# Patient Record
Sex: FEMALE | Race: ASIAN | HISPANIC OR LATINO | URBAN - METROPOLITAN AREA
[De-identification: names, ages, dates, MRNs, and addresses within clinical notes are randomized per-mention and may not be internally consistent; named-entity substitution may affect disease eponyms.]

---

## 2021-06-10 ENCOUNTER — EMERGENCY (EMERGENCY)
Facility: HOSPITAL | Age: 31
LOS: 1 days | Discharge: HOME | End: 2021-06-10
Attending: STUDENT IN AN ORGANIZED HEALTH CARE EDUCATION/TRAINING PROGRAM | Admitting: STUDENT IN AN ORGANIZED HEALTH CARE EDUCATION/TRAINING PROGRAM
Payer: MEDICAID

## 2021-06-10 VITALS
RESPIRATION RATE: 18 BRPM | OXYGEN SATURATION: 100 % | HEART RATE: 82 BPM | TEMPERATURE: 98 F | DIASTOLIC BLOOD PRESSURE: 77 MMHG | SYSTOLIC BLOOD PRESSURE: 114 MMHG

## 2021-06-10 VITALS
HEART RATE: 72 BPM | RESPIRATION RATE: 17 BRPM | OXYGEN SATURATION: 100 % | SYSTOLIC BLOOD PRESSURE: 116 MMHG | DIASTOLIC BLOOD PRESSURE: 69 MMHG

## 2021-06-10 LAB
ALBUMIN SERPL ELPH-MCNC: 4.4 G/DL — SIGNIFICANT CHANGE UP (ref 3.3–5)
ALP SERPL-CCNC: 37 U/L — LOW (ref 40–120)
ALT FLD-CCNC: 14 U/L — SIGNIFICANT CHANGE UP (ref 4–33)
ANION GAP SERPL CALC-SCNC: 13 MMOL/L — SIGNIFICANT CHANGE UP (ref 7–14)
AST SERPL-CCNC: 14 U/L — SIGNIFICANT CHANGE UP (ref 4–32)
BASOPHILS # BLD AUTO: 0.04 K/UL — SIGNIFICANT CHANGE UP (ref 0–0.2)
BASOPHILS NFR BLD AUTO: 0.5 % — SIGNIFICANT CHANGE UP (ref 0–2)
BILIRUB SERPL-MCNC: 0.3 MG/DL — SIGNIFICANT CHANGE UP (ref 0.2–1.2)
BLD GP AB SCN SERPL QL: NEGATIVE — SIGNIFICANT CHANGE UP
BUN SERPL-MCNC: 8 MG/DL — SIGNIFICANT CHANGE UP (ref 7–23)
CALCIUM SERPL-MCNC: 9 MG/DL — SIGNIFICANT CHANGE UP (ref 8.4–10.5)
CHLORIDE SERPL-SCNC: 103 MMOL/L — SIGNIFICANT CHANGE UP (ref 98–107)
CO2 SERPL-SCNC: 21 MMOL/L — LOW (ref 22–31)
CREAT SERPL-MCNC: 0.51 MG/DL — SIGNIFICANT CHANGE UP (ref 0.5–1.3)
EOSINOPHIL # BLD AUTO: 0.46 K/UL — SIGNIFICANT CHANGE UP (ref 0–0.5)
EOSINOPHIL NFR BLD AUTO: 5.3 % — SIGNIFICANT CHANGE UP (ref 0–6)
GLUCOSE SERPL-MCNC: 93 MG/DL — SIGNIFICANT CHANGE UP (ref 70–99)
HCT VFR BLD CALC: 41.8 % — SIGNIFICANT CHANGE UP (ref 34.5–45)
HGB BLD-MCNC: 13.5 G/DL — SIGNIFICANT CHANGE UP (ref 11.5–15.5)
IANC: 5.8 K/UL — SIGNIFICANT CHANGE UP (ref 1.5–8.5)
IMM GRANULOCYTES NFR BLD AUTO: 0.3 % — SIGNIFICANT CHANGE UP (ref 0–1.5)
LYMPHOCYTES # BLD AUTO: 1.79 K/UL — SIGNIFICANT CHANGE UP (ref 1–3.3)
LYMPHOCYTES # BLD AUTO: 20.5 % — SIGNIFICANT CHANGE UP (ref 13–44)
MCHC RBC-ENTMCNC: 30.6 PG — SIGNIFICANT CHANGE UP (ref 27–34)
MCHC RBC-ENTMCNC: 32.3 GM/DL — SIGNIFICANT CHANGE UP (ref 32–36)
MCV RBC AUTO: 94.8 FL — SIGNIFICANT CHANGE UP (ref 80–100)
MONOCYTES # BLD AUTO: 0.61 K/UL — SIGNIFICANT CHANGE UP (ref 0–0.9)
MONOCYTES NFR BLD AUTO: 7 % — SIGNIFICANT CHANGE UP (ref 2–14)
NEUTROPHILS # BLD AUTO: 5.8 K/UL — SIGNIFICANT CHANGE UP (ref 1.8–7.4)
NEUTROPHILS NFR BLD AUTO: 66.4 % — SIGNIFICANT CHANGE UP (ref 43–77)
NRBC # BLD: 0 /100 WBCS — SIGNIFICANT CHANGE UP
NRBC # FLD: 0 K/UL — SIGNIFICANT CHANGE UP
PLATELET # BLD AUTO: 262 K/UL — SIGNIFICANT CHANGE UP (ref 150–400)
POTASSIUM SERPL-MCNC: 3.7 MMOL/L — SIGNIFICANT CHANGE UP (ref 3.5–5.3)
POTASSIUM SERPL-SCNC: 3.7 MMOL/L — SIGNIFICANT CHANGE UP (ref 3.5–5.3)
PROT SERPL-MCNC: 8.1 G/DL — SIGNIFICANT CHANGE UP (ref 6–8.3)
RBC # BLD: 4.41 M/UL — SIGNIFICANT CHANGE UP (ref 3.8–5.2)
RBC # FLD: 12.1 % — SIGNIFICANT CHANGE UP (ref 10.3–14.5)
RH IG SCN BLD-IMP: POSITIVE — SIGNIFICANT CHANGE UP
SODIUM SERPL-SCNC: 137 MMOL/L — SIGNIFICANT CHANGE UP (ref 135–145)
WBC # BLD: 8.73 K/UL — SIGNIFICANT CHANGE UP (ref 3.8–10.5)
WBC # FLD AUTO: 8.73 K/UL — SIGNIFICANT CHANGE UP (ref 3.8–10.5)

## 2021-06-10 PROCEDURE — 99284 EMERGENCY DEPT VISIT MOD MDM: CPT

## 2021-06-10 PROCEDURE — 76817 TRANSVAGINAL US OBSTETRIC: CPT | Mod: 26

## 2021-06-10 NOTE — ED PROVIDER NOTE - OBJECTIVE STATEMENT
31y Female with ?history of PCOS,  presents to the ER for vaginal bleeding. 31y Female with ?history of PCOS,  presents to the ER for vaginal bleeding. Patient states she is about 6 weeks pregnant, developed vaginal bleeding that started at 4am. Reports it is spotting, no clot passage associated with bilateral lower abdominal/pelvic cramping. Last LMP was 21 but states she does not get her period regularly. Seen by OB last week Dr. Virginia Byrd in [place] and hcg was 6,000. No bedside ultrasound performed. First ultrasound scheduled for 21. Denies fever, chills or painful urination.

## 2021-06-10 NOTE — ED PROVIDER NOTE - CLINICAL SUMMARY MEDICAL DECISION MAKING FREE TEXT BOX
31y Female with ?history of PCOS,  presents to the ER for vaginal bleeding. Closed cervix, without vaginal bleeding or pooling. Will get labs, TVUS, reassess. Dispo pending results.

## 2021-06-10 NOTE — ED PROVIDER NOTE - NSFOLLOWUPINSTRUCTIONS_ED_ALL_ED_FT
FOLLOW UP WITH YOUR OB WITHIN 48-72 HOURS - BRING A COPY OF YOUR RESULTS.     Take Tylenol 650mg (Two 325 mg pills) every 4-6 hours as needed for pain.      Vaginal Bleeding During Pregnancy, First Trimester       A small amount of bleeding (spotting) from the vagina is common during early pregnancy. Sometimes the bleeding is normal and does not cause problems. At other times, though, bleeding may be a sign of something serious. Tell your doctor about any bleeding from your vagina right away.    Follow these instructions at home:    Activity     •Follow your doctor's instructions about how active you can be.  •If needed, make plans for someone to help with your normal activities.  • Do not have sex or orgasms until your doctor says that this is safe.    General instructions     •Take over-the-counter and prescription medicines only as told by your doctor.  •Watch your condition for any changes.    •Write down:  •The number of pads you use each day.  •How often you change pads.  •How soaked (saturated) your pads are.  • Do not use tampons.  • Do not douche.    •If you pass any tissue from your vagina, save it to show to your doctor.  •Keep all follow-up visits as told by your doctor. This is important.      Contact a doctor if:    •You have vaginal bleeding at any time while you are pregnant.  •You have cramps.  •You have a fever.    Get help right away if:    •You have very bad cramps in your back or belly (abdomen).  •You pass large clots or a lot of tissue from your vagina.  •Your bleeding gets worse.  •You feel light-headed.  •You feel weak.  •You pass out (faint).  •You have chills.  •You are leaking fluid from your vagina.  •You have a gush of fluid from your vagina.    Summary    •Sometimes vaginal bleeding during pregnancy is normal and does not cause problems. At other times, bleeding may be a sign of something serious.  •Tell your doctor about any bleeding from your vagina right away.  •Follow your doctor's instructions about how active you can be. You may need someone to help you with your normal activities.    This information is not intended to replace advice given to you by your health care provider. Make sure you discuss any questions you have with your health care provider.    Advance activity as tolerated.     Continue all previously prescribed medications as directed unless otherwise instructed.     If you have issues obtaining follow up, please call: [phone] to obtain a doctor or specialist who takes your insurance in your area.  You may call [phone] to make an appointment with the internal medicine clinic.

## 2021-06-10 NOTE — ED PROVIDER NOTE - NS ED ROS FT
Constitutional: (-) Fever, (-) Chills, (-) Anorexia  Skin: (-) Color changes, (-) Rashes, (-) Wounds  Eyes: (-) Visual changes  CV: (-) Chest pain, (-) Palpitations  Resp: (-) Cough, (-) Shortness of breath, (-) Dyspnea on Exertion, (-) Wheezing  GI: (+) Abdominal pain, (-) Nausea, (-) Vomiting, (-) Diarrhea  : (+)Vaginal bleeding, (-) Dysuria, (-) Hematuria, (-) Increased frequency  MSK: (-) Weakness, (-) Myalgias, (-) Back pain, (-) Calf pain  Neuro: (-) Headache

## 2021-06-10 NOTE — ED PROVIDER NOTE - PATIENT PORTAL LINK FT
You can access the FollowMyHealth Patient Portal offered by [facility] by registering at the following website: [email or web address]. By joining [employer]’s FollowMyHealth portal, you will also be able to view your health information using other applications (apps) compatible with our system.

## 2021-06-10 NOTE — ED PROVIDER NOTE - PROGRESS NOTE DETAILS
JORDAN Valdes: TVUS performed at bedside by ED US team - intrauterine pregnancy measuring 6 weeks 1 day, HR 111bpm (within normal limit for this age), left ovarian cyst measuring 8mm. Results discussed with Dr. Choi and patient. Lab work pending. Will continuing to reassess. JORDAN Valdes: Results reviewed with patient. Pending serum hcg. Patient denies pain or any new complaints at this time. Plan to discharge home with OB follow up and strict return precautions.

## 2021-06-10 NOTE — ED PROVIDER NOTE - GENITOURINARY SPECULUM EXAM
closed cervix, no abnormal discharge, vaginal bleeding or clots. mild left adnexal pain, no cervical motion tenderness

## 2021-06-10 NOTE — ED PROVIDER NOTE - ATTENDING CONTRIBUTION TO CARE
30 yo female with PMH PCOS, , LMP 21 presents to ED for evaluation of vaginal bleeding and BL low abd cramping. Denies urinary symptoms.   Gen: no acute distress, well appearing, awake, alert and oriented x 3  Cardiac: regular rate and rhythm, +S1S2  Pulm: Clear to auscultation bilaterally  Abd: soft, nontender, nondistended, no guarding  A/P labs, imaging, reassess

## 2021-08-14 ENCOUNTER — OUTPATIENT (OUTPATIENT)
Dept: OUTPATIENT SERVICES | Facility: HOSPITAL | Age: 31
LOS: 1 days | Discharge: HOME | End: 2021-08-14
Payer: MEDICAID

## 2021-08-14 VITALS
SYSTOLIC BLOOD PRESSURE: 110 MMHG | RESPIRATION RATE: 16 BRPM | HEART RATE: 93 BPM | TEMPERATURE: 98 F | DIASTOLIC BLOOD PRESSURE: 51 MMHG

## 2021-08-14 VITALS — SYSTOLIC BLOOD PRESSURE: 110 MMHG | DIASTOLIC BLOOD PRESSURE: 51 MMHG | HEART RATE: 93 BPM

## 2021-08-14 DIAGNOSIS — Z3A.00 WEEKS OF GESTATION OF PREGNANCY NOT SPECIFIED: ICD-10-CM

## 2021-08-14 DIAGNOSIS — O26.899 OTHER SPECIFIED PREGNANCY RELATED CONDITIONS, UNSPECIFIED TRIMESTER: ICD-10-CM

## 2021-08-14 PROCEDURE — 99214 OFFICE O/P EST MOD 30 MIN: CPT

## 2021-08-14 NOTE — OB PROVIDER TRIAGE NOTE - ADDITIONAL INSTRUCTIONS
Discussed findings with Dr Vasques  Patient is cleared for discharge home.  To f/u with Dr Vasques tomorrow 8/15/21, as scheduled.

## 2021-08-14 NOTE — OB PROVIDER TRIAGE NOTE - HISTORY OF PRESENT ILLNESS
[facility] Provider:  Dr Vasques  EDC:  2022.  Patient is a 30y/o  15 3/7wks gestation who reports to triage with c/o pinkish spotting on her panty liner this morning;  0800 and intermittent lower back pain x 1 wk.  Denies dysuria, fever, chills, loss of fluids, abdominal cramping or recent coitus.    AP Course:  h/o vaginal spotting and abdominal cramping on 6/10.  Meds:  pnv  NKDA    PMHx - bilateral  breast mass  PSHx - lumpectomy of benign rt. breast mass - 10yr ago  OB - Present  Gyn - PCOS;  SH/Psych - denies

## 2021-08-14 NOTE — OB PROVIDER TRIAGE NOTE - NSHPPHYSICALEXAM_GEN_ALL_CORE
Vital Signs   T(C): 36.8 (14 Aug 2021 10:45), Max: 36.8 (14 Aug 2021 10:45)  T(F): 98.2 (14 Aug 2021 10:45), Max: 98.2 (14 Aug 2021 10:45)  HR: 93 (14 Aug 2021 10:52) (93 - 93)  BP: 110/51 (14 Aug 2021 10:52) (110/51 - 110/51)  RR: 16 (14 Aug 2021 10:45) (16 - 16)    Abdomen gravid, soft and nontender  TAS - breech/ant plac/mvp 3.24           positive FH 150bpm  SSE - No active process noted Vital Signs   T(C): 36.8 (14 Aug 2021 10:45), Max: 36.8 (14 Aug 2021 10:45)  T(F): 98.2 (14 Aug 2021 10:45), Max: 98.2 (14 Aug 2021 10:45)  HR: 93 (14 Aug 2021 10:52) (93 - 93)  BP: 110/51 (14 Aug 2021 10:52) (110/51 - 110/51)  RR: 16 (14 Aug 2021 10:45) (16 - 16)  Blood type - A +    Abdomen gravid, soft and nontender  Neg cva/suprapubic pain/tenderness  TAS - breech/ant plac/mvp 3.24           positive FH 150bpm           ant wall fibroid noted.  SSE - No active process noted.  Friable cervix.  Pinkish staining when touched.  Cervix appears closed on inspection.  SVE - deferred  TVUS - Cervical length 3.3 to 3.36 cm with no dynamic changes

## 2021-08-14 NOTE — OB PROVIDER TRIAGE NOTE - NSOBPROVIDERNOTE_OBGYN_ALL_OB_FT
No evidence of active vaginal bleeding.  Discussed findings with Dr Vasques.  Plan: patient is cleared for discharge home with bleeding precautions   Instructed to rest.  Encouraged to f/u with Dr Vasques tomorrow 8/15/21 as scheduled.

## 2021-11-11 PROBLEM — Z00.00 ENCOUNTER FOR PREVENTIVE HEALTH EXAMINATION: Status: ACTIVE | Noted: 2021-11-11

## 2021-11-19 ENCOUNTER — APPOINTMENT (OUTPATIENT)
Dept: MATERNAL FETAL MEDICINE | Facility: CLINIC | Age: 31
End: 2021-11-19

## 2021-11-19 ENCOUNTER — APPOINTMENT (OUTPATIENT)
Dept: ANTEPARTUM | Facility: CLINIC | Age: 31
End: 2021-11-19

## 2021-12-01 ENCOUNTER — OUTPATIENT (OUTPATIENT)
Dept: INPATIENT UNIT | Facility: HOSPITAL | Age: 31
LOS: 1 days | Discharge: HOME | End: 2021-12-01
Payer: MEDICAID

## 2021-12-01 VITALS
SYSTOLIC BLOOD PRESSURE: 107 MMHG | DIASTOLIC BLOOD PRESSURE: 56 MMHG | HEART RATE: 78 BPM | RESPIRATION RATE: 16 BRPM | TEMPERATURE: 98 F

## 2021-12-01 VITALS — DIASTOLIC BLOOD PRESSURE: 55 MMHG | SYSTOLIC BLOOD PRESSURE: 95 MMHG | HEART RATE: 80 BPM

## 2021-12-01 DIAGNOSIS — O26.899 OTHER SPECIFIED PREGNANCY RELATED CONDITIONS, UNSPECIFIED TRIMESTER: ICD-10-CM

## 2021-12-01 DIAGNOSIS — Z3A.00 WEEKS OF GESTATION OF PREGNANCY NOT SPECIFIED: ICD-10-CM

## 2021-12-01 PROCEDURE — 76818 FETAL BIOPHYS PROFILE W/NST: CPT | Mod: 26

## 2021-12-01 PROCEDURE — 99214 OFFICE O/P EST MOD 30 MIN: CPT | Mod: 25

## 2021-12-01 PROCEDURE — 76816 OB US FOLLOW-UP PER FETUS: CPT | Mod: 26

## 2021-12-01 NOTE — OB PROVIDER TRIAGE NOTE - NSOBPROVIDERNOTE_OBGYN_ALL_OB_FT
d/w Dr Mills d/c home resolved decreased fetal movement @ 31 wks  maternal and fetal surveillance reassuring  rest activity as tolerated  increase water intake   labor precautions instructed  keep all OB appointments   return for vaginal bleeding decreased fetal movements or any concerns  v/w discharge instructions given with patient understanding

## 2021-12-01 NOTE — OB RN TRIAGE NOTE - CHIEF COMPLAINT QUOTE
decreased fetal movement since 11/30 0100 and cramping no fetal movement since 11/30 2130 and cramping

## 2021-12-01 NOTE — OB PROVIDER TRIAGE NOTE - NS_PELVICEXAM_OBGYN_ALL_OB

## 2021-12-01 NOTE — OB PROVIDER TRIAGE NOTE - NSHPPHYSICALEXAM_GEN_ALL_CORE
abd soft gravid NT  TAS: breech  fundal placenta  +FM +FHR  EFW:1131g/2#8  WILTON: 10.76  FHT: moderate variability + accels negative decels  toco: none  Vital Signs Last 24 Hrs  T(C): 36.7 (01 Dec 2021 01:02), Max: 36.7 (01 Dec 2021 01:02)  T(F): 98.1 (01 Dec 2021 01:02), Max: 98.1 (01 Dec 2021 01:02)  HR: 78 (01 Dec 2021 03:28) (78 - 78)  BP: 87/58 (01 Dec 2021 03:28) (87/58 - 107/56)  BP(mean): --  RR: 16 (01 Dec 2021 01:02) (16 - 16)  SpO2: --

## 2021-12-01 NOTE — OB RN TRIAGE NOTE - FALL HARM RISK - UNIVERSAL INTERVENTIONS
Bed in lowest position, wheels locked, appropriate side rails in place/Call bell, personal items and telephone in reach/Instruct patient to call for assistance before getting out of bed or chair/Non-slip footwear when patient is out of bed/[place] to call system/Physically safe environment - no spills, clutter or unnecessary equipment/Purposeful Proactive Rounding/Room/bathroom lighting operational, light cord in reach

## 2021-12-01 NOTE — OB PROVIDER TRIAGE NOTE - HISTORY OF PRESENT ILLNESS
32 yo  @ 31 wks c/o no fetal movements since Monday night now with movement while in D&T. denies vb or lof, reports +GFM while in triage AP course uncomplicated thus far. denies fever chills ha n/v new swelling vision changes cp sob or cough. last saw OB .    GBS: unknown  meds: PNV Vit D  all: denies  PMH asthma(15 yrs ago) allergies  PSH: right breast biopsy   gyn hx: denies  ob hx: TOP x1 11 yrs ago

## 2022-01-04 ENCOUNTER — OUTPATIENT (OUTPATIENT)
Dept: INPATIENT UNIT | Facility: HOSPITAL | Age: 32
LOS: 1 days | Discharge: HOME | End: 2022-01-04
Payer: MEDICAID

## 2022-01-04 VITALS
RESPIRATION RATE: 15 BRPM | TEMPERATURE: 98 F | SYSTOLIC BLOOD PRESSURE: 100 MMHG | HEART RATE: 83 BPM | DIASTOLIC BLOOD PRESSURE: 57 MMHG

## 2022-01-04 VITALS — DIASTOLIC BLOOD PRESSURE: 57 MMHG | HEART RATE: 83 BPM | SYSTOLIC BLOOD PRESSURE: 100 MMHG

## 2022-01-04 DIAGNOSIS — O26.899 OTHER SPECIFIED PREGNANCY RELATED CONDITIONS, UNSPECIFIED TRIMESTER: ICD-10-CM

## 2022-01-04 DIAGNOSIS — Z3A.00 WEEKS OF GESTATION OF PREGNANCY NOT SPECIFIED: ICD-10-CM

## 2022-01-04 LAB
APPEARANCE UR: CLEAR — SIGNIFICANT CHANGE UP
BILIRUB UR-MCNC: NEGATIVE — SIGNIFICANT CHANGE UP
COLOR SPEC: SIGNIFICANT CHANGE UP
DIFF PNL FLD: NEGATIVE — SIGNIFICANT CHANGE UP
GLUCOSE UR QL: ABNORMAL
KETONES UR-MCNC: NEGATIVE — SIGNIFICANT CHANGE UP
LEUKOCYTE ESTERASE UR-ACNC: NEGATIVE — SIGNIFICANT CHANGE UP
NITRITE UR-MCNC: NEGATIVE — SIGNIFICANT CHANGE UP
PH UR: 7 — SIGNIFICANT CHANGE UP (ref 5–8)
PROT UR-MCNC: NEGATIVE — SIGNIFICANT CHANGE UP
SP GR SPEC: 1.01 — SIGNIFICANT CHANGE UP (ref 1–1.05)
UROBILINOGEN FLD QL: SIGNIFICANT CHANGE UP

## 2022-01-04 PROCEDURE — 76818 FETAL BIOPHYS PROFILE W/NST: CPT | Mod: 26

## 2022-01-04 PROCEDURE — 99213 OFFICE O/P EST LOW 20 MIN: CPT

## 2022-01-04 NOTE — OB PROVIDER TRIAGE NOTE - ADDITIONAL INSTRUCTIONS
no evidence of uti/ptl or dehydration  plan discussed with dr dobson  maternal/fetal surveillance reassuring  instructed to  medication prescribed by dr livingston for heartburn   labor instructions reviewed with patient  continue fetal kick counts, rest and activity as tolerated, increase PO fluid  follow up with dr livingston as scheduled  pt verbalized understanding of instructions given  discharged home

## 2022-01-04 NOTE — OB PROVIDER TRIAGE NOTE - NSHPLABSRESULTS_GEN_ALL_CORE
Urinalysis Basic - ( 2022 17:42 )    Color: Light Yellow / Appearance: Clear / S.010 / pH: x  Gluc: x / Ketone: Negative  / Bili: Negative / Urobili: <2 mg/dL   Blood: x / Protein: Negative / Nitrite: Negative   Leuk Esterase: Negative / RBC: x / WBC x   Sq Epi: x / Non Sq Epi: x / Bacteria: x

## 2022-01-04 NOTE — OB PROVIDER TRIAGE NOTE - NSHPPHYSICALEXAM_GEN_ALL_CORE
Vital Signs Last 24 Hrs  T(C): 36.7 (04 Jan 2022 16:44), Max: 36.7 (04 Jan 2022 16:44)  T(F): 98.1 (04 Jan 2022 16:44), Max: 98.1 (04 Jan 2022 16:44)  HR: 83 (04 Jan 2022 17:18) (83 - 83)  BP: 100/57 (04 Jan 2022 17:18) (100/57 - 100/57)  BP(mean): --  RR: 15 (04 Jan 2022 16:44) (15 - 15)  SpO2: --    A&O x3  CTAb  abdomen: gravid, soft, nontender  sve- deferred  nst in progress Vital Signs Last 24 Hrs  T(C): 36.7 (04 Jan 2022 16:44), Max: 36.7 (04 Jan 2022 16:44)  T(F): 98.1 (04 Jan 2022 16:44), Max: 98.1 (04 Jan 2022 16:44)  HR: 83 (04 Jan 2022 17:18) (83 - 83)  BP: 100/57 (04 Jan 2022 17:18) (100/57 - 100/57)  BP(mean): --  RR: 15 (04 Jan 2022 16:44) (15 - 15)  SpO2: --    A&O x3  CTAb  abdomen: gravid, soft, nontender, no CVA tenderness  sve- 0/0/-3  nst: 155 baseline, moderate variability, positive accels, no decels, no contractions  TAS: vtx, anterior placenta, bpp 8/8, mandi 9

## 2022-01-04 NOTE — OB PROVIDER TRIAGE NOTE - NS_AFI_OBGYN_ALL_OB_FT
1945 Informed by charge nurse that nephrolgist Dr. Adolph Milligan has been informed about consult; pt to be seen by Nephrology in the morning. 2051 1 unit PRBC transfusion completed. 2129 Pt alert. Asst to restroom. Pt states that she had dialysis prior to coming to hospital.     2317 Pt c/o indigestion after eating diabetic snack of crackers. Informed Dr. Agustin Mayorga, on-call hospitalist. She will place order for Tums if appropriate. 0023 Tums given. Informed pt that she is on clear liquid diet at this time, and recommended to avoid crackers. Pt agreed. SHIFT SUMMARY: Transfusion completed. Pt had BM with formed brown stool. Minimal pain except for digestion. She feels constipated in the AM, but she was informed that stool softener (colace) and miralax will be given this AM and well as bowel prep tomorrow night. Oncoming nurse was informed about held orders for bowel prep. 9

## 2022-01-04 NOTE — OB PROVIDER TRIAGE NOTE - NSOBPROVIDERNOTE_OBGYN_ALL_OB_FT
u/a sent u/a sent    This is a 31 year old  at 35.6 weeks gestational age presents to r/o ptl    no evidence of uti/ptl or dehydration  plan discussed with dr dobson  maternal/fetal surveillance reassuring  instructed to  medication prescribed by dr livingston for heartburn   labor instructions reviewed with patient  continue fetal kick counts, rest and activity as tolerated, increase PO fluid  follow up with dr livingston as scheduled  pt verbalized understanding of instructions given  discharged home

## 2022-02-07 ENCOUNTER — INPATIENT (INPATIENT)
Facility: HOSPITAL | Age: 32
LOS: 1 days | Discharge: HOME | End: 2022-02-09
Attending: OBSTETRICS & GYNECOLOGY | Admitting: OBSTETRICS & GYNECOLOGY

## 2022-02-07 ENCOUNTER — TRANSCRIPTION ENCOUNTER (OUTPATIENT)
Age: 32
End: 2022-02-07

## 2022-02-07 VITALS — TEMPERATURE: 98 F | DIASTOLIC BLOOD PRESSURE: 56 MMHG | SYSTOLIC BLOOD PRESSURE: 100 MMHG | HEART RATE: 82 BPM

## 2022-02-07 DIAGNOSIS — O26.899 OTHER SPECIFIED PREGNANCY RELATED CONDITIONS, UNSPECIFIED TRIMESTER: ICD-10-CM

## 2022-02-07 DIAGNOSIS — Z3A.00 WEEKS OF GESTATION OF PREGNANCY NOT SPECIFIED: ICD-10-CM

## 2022-02-07 LAB
BASOPHILS # BLD AUTO: 0.03 K/UL — SIGNIFICANT CHANGE UP (ref 0–0.2)
BASOPHILS NFR BLD AUTO: 0.4 % — SIGNIFICANT CHANGE UP (ref 0–2)
BLD GP AB SCN SERPL QL: NEGATIVE — SIGNIFICANT CHANGE UP
COVID-19 SPIKE DOMAIN AB INTERP: POSITIVE
COVID-19 SPIKE DOMAIN ANTIBODY RESULT: >250 U/ML — HIGH
EOSINOPHIL # BLD AUTO: 0.32 K/UL — SIGNIFICANT CHANGE UP (ref 0–0.5)
EOSINOPHIL NFR BLD AUTO: 3.9 % — SIGNIFICANT CHANGE UP (ref 0–6)
HCT VFR BLD CALC: 36.6 % — SIGNIFICANT CHANGE UP (ref 34.5–45)
HGB BLD-MCNC: 12.1 G/DL — SIGNIFICANT CHANGE UP (ref 11.5–15.5)
IANC: 5.25 K/UL — SIGNIFICANT CHANGE UP (ref 1.5–8.5)
IMM GRANULOCYTES NFR BLD AUTO: 0.4 % — SIGNIFICANT CHANGE UP (ref 0–1.5)
LYMPHOCYTES # BLD AUTO: 1.59 K/UL — SIGNIFICANT CHANGE UP (ref 1–3.3)
LYMPHOCYTES # BLD AUTO: 19.6 % — SIGNIFICANT CHANGE UP (ref 13–44)
MCHC RBC-ENTMCNC: 30.6 PG — SIGNIFICANT CHANGE UP (ref 27–34)
MCHC RBC-ENTMCNC: 33.1 GM/DL — SIGNIFICANT CHANGE UP (ref 32–36)
MCV RBC AUTO: 92.4 FL — SIGNIFICANT CHANGE UP (ref 80–100)
MONOCYTES # BLD AUTO: 0.89 K/UL — SIGNIFICANT CHANGE UP (ref 0–0.9)
MONOCYTES NFR BLD AUTO: 11 % — SIGNIFICANT CHANGE UP (ref 2–14)
NEUTROPHILS # BLD AUTO: 5.25 K/UL — SIGNIFICANT CHANGE UP (ref 1.8–7.4)
NEUTROPHILS NFR BLD AUTO: 64.7 % — SIGNIFICANT CHANGE UP (ref 43–77)
NRBC # BLD: 0 /100 WBCS — SIGNIFICANT CHANGE UP
NRBC # FLD: 0 K/UL — SIGNIFICANT CHANGE UP
PLATELET # BLD AUTO: 213 K/UL — SIGNIFICANT CHANGE UP (ref 150–400)
RBC # BLD: 3.96 M/UL — SIGNIFICANT CHANGE UP (ref 3.8–5.2)
RBC # FLD: 13.2 % — SIGNIFICANT CHANGE UP (ref 10.3–14.5)
RH IG SCN BLD-IMP: POSITIVE — SIGNIFICANT CHANGE UP
SARS-COV-2 IGG+IGM SERPL QL IA: >250 U/ML — HIGH
SARS-COV-2 IGG+IGM SERPL QL IA: POSITIVE
T PALLIDUM AB TITR SER: NEGATIVE — SIGNIFICANT CHANGE UP
WBC # BLD: 8.11 K/UL — SIGNIFICANT CHANGE UP (ref 3.8–10.5)
WBC # FLD AUTO: 8.11 K/UL — SIGNIFICANT CHANGE UP (ref 3.8–10.5)

## 2022-02-07 RX ORDER — OXYTOCIN 10 UNIT/ML
333.33 VIAL (ML) INJECTION
Qty: 20 | Refills: 0 | Status: COMPLETED | OUTPATIENT
Start: 2022-02-07 | End: 2022-02-07

## 2022-02-07 RX ORDER — OXYCODONE HYDROCHLORIDE 5 MG/1
5 TABLET ORAL ONCE
Refills: 0 | Status: DISCONTINUED | OUTPATIENT
Start: 2022-02-07 | End: 2022-02-09

## 2022-02-07 RX ORDER — TETANUS TOXOID, REDUCED DIPHTHERIA TOXOID AND ACELLULAR PERTUSSIS VACCINE, ADSORBED 5; 2.5; 8; 8; 2.5 [IU]/.5ML; [IU]/.5ML; UG/.5ML; UG/.5ML; UG/.5ML
0.5 SUSPENSION INTRAMUSCULAR ONCE
Refills: 0 | Status: COMPLETED | OUTPATIENT
Start: 2022-02-07

## 2022-02-07 RX ORDER — ONDANSETRON 8 MG/1
4 TABLET, FILM COATED ORAL ONCE
Refills: 0 | Status: COMPLETED | OUTPATIENT
Start: 2022-02-07 | End: 2022-02-07

## 2022-02-07 RX ORDER — KETOROLAC TROMETHAMINE 30 MG/ML
30 SYRINGE (ML) INJECTION ONCE
Refills: 0 | Status: DISCONTINUED | OUTPATIENT
Start: 2022-02-07 | End: 2022-02-07

## 2022-02-07 RX ORDER — OXYTOCIN 10 UNIT/ML
2 VIAL (ML) INJECTION
Qty: 30 | Refills: 0 | Status: DISCONTINUED | OUTPATIENT
Start: 2022-02-07 | End: 2022-02-07

## 2022-02-07 RX ORDER — SODIUM CHLORIDE 9 MG/ML
1000 INJECTION, SOLUTION INTRAVENOUS
Refills: 0 | Status: DISCONTINUED | OUTPATIENT
Start: 2022-02-07 | End: 2022-02-07

## 2022-02-07 RX ORDER — IBUPROFEN 200 MG
600 TABLET ORAL EVERY 6 HOURS
Refills: 0 | Status: COMPLETED | OUTPATIENT
Start: 2022-02-07 | End: 2023-01-06

## 2022-02-07 RX ORDER — OXYTOCIN 10 UNIT/ML
333.33 VIAL (ML) INJECTION
Qty: 20 | Refills: 0 | Status: DISCONTINUED | OUTPATIENT
Start: 2022-02-07 | End: 2022-02-08

## 2022-02-07 RX ORDER — MAGNESIUM HYDROXIDE 400 MG/1
30 TABLET, CHEWABLE ORAL
Refills: 0 | Status: DISCONTINUED | OUTPATIENT
Start: 2022-02-07 | End: 2022-02-09

## 2022-02-07 RX ORDER — PRAMOXINE HYDROCHLORIDE 150 MG/15G
1 AEROSOL, FOAM RECTAL EVERY 4 HOURS
Refills: 0 | Status: DISCONTINUED | OUTPATIENT
Start: 2022-02-07 | End: 2022-02-09

## 2022-02-07 RX ORDER — ACETAMINOPHEN 500 MG
975 TABLET ORAL
Refills: 0 | Status: DISCONTINUED | OUTPATIENT
Start: 2022-02-07 | End: 2022-02-08

## 2022-02-07 RX ORDER — BUTORPHANOL TARTRATE 2 MG/ML
2 INJECTION, SOLUTION INTRAMUSCULAR; INTRAVENOUS ONCE
Refills: 0 | Status: DISCONTINUED | OUTPATIENT
Start: 2022-02-07 | End: 2022-02-07

## 2022-02-07 RX ORDER — ALBUTEROL 90 UG/1
2 AEROSOL, METERED ORAL EVERY 6 HOURS
Refills: 0 | Status: DISCONTINUED | OUTPATIENT
Start: 2022-02-07 | End: 2022-02-09

## 2022-02-07 RX ORDER — LANOLIN
1 OINTMENT (GRAM) TOPICAL EVERY 6 HOURS
Refills: 0 | Status: DISCONTINUED | OUTPATIENT
Start: 2022-02-07 | End: 2022-02-09

## 2022-02-07 RX ORDER — BENZOCAINE 10 %
1 GEL (GRAM) MUCOUS MEMBRANE EVERY 6 HOURS
Refills: 0 | Status: DISCONTINUED | OUTPATIENT
Start: 2022-02-07 | End: 2022-02-09

## 2022-02-07 RX ORDER — DIBUCAINE 1 %
1 OINTMENT (GRAM) RECTAL EVERY 6 HOURS
Refills: 0 | Status: DISCONTINUED | OUTPATIENT
Start: 2022-02-07 | End: 2022-02-09

## 2022-02-07 RX ORDER — SODIUM CHLORIDE 9 MG/ML
3 INJECTION INTRAMUSCULAR; INTRAVENOUS; SUBCUTANEOUS EVERY 8 HOURS
Refills: 0 | Status: DISCONTINUED | OUTPATIENT
Start: 2022-02-07 | End: 2022-02-09

## 2022-02-07 RX ORDER — SIMETHICONE 80 MG/1
80 TABLET, CHEWABLE ORAL EVERY 4 HOURS
Refills: 0 | Status: DISCONTINUED | OUTPATIENT
Start: 2022-02-07 | End: 2022-02-09

## 2022-02-07 RX ORDER — DIPHENHYDRAMINE HCL 50 MG
25 CAPSULE ORAL EVERY 6 HOURS
Refills: 0 | Status: DISCONTINUED | OUTPATIENT
Start: 2022-02-07 | End: 2022-02-09

## 2022-02-07 RX ORDER — IBUPROFEN 200 MG
1 TABLET ORAL
Qty: 0 | Refills: 0 | DISCHARGE
Start: 2022-02-07

## 2022-02-07 RX ORDER — HYDROCORTISONE 1 %
1 OINTMENT (GRAM) TOPICAL EVERY 6 HOURS
Refills: 0 | Status: DISCONTINUED | OUTPATIENT
Start: 2022-02-07 | End: 2022-02-09

## 2022-02-07 RX ORDER — OXYCODONE HYDROCHLORIDE 5 MG/1
5 TABLET ORAL
Refills: 0 | Status: DISCONTINUED | OUTPATIENT
Start: 2022-02-07 | End: 2022-02-09

## 2022-02-07 RX ORDER — AER TRAVELER 0.5 G/1
1 SOLUTION RECTAL; TOPICAL EVERY 4 HOURS
Refills: 0 | Status: DISCONTINUED | OUTPATIENT
Start: 2022-02-07 | End: 2022-02-09

## 2022-02-07 RX ADMIN — Medication 30 MILLIGRAM(S): at 19:52

## 2022-02-07 RX ADMIN — Medication 2 MILLIUNIT(S)/MIN: at 15:19

## 2022-02-07 RX ADMIN — BUTORPHANOL TARTRATE 2 MILLIGRAM(S): 2 INJECTION, SOLUTION INTRAMUSCULAR; INTRAVENOUS at 10:20

## 2022-02-07 RX ADMIN — SODIUM CHLORIDE 3 MILLILITER(S): 9 INJECTION INTRAMUSCULAR; INTRAVENOUS; SUBCUTANEOUS at 22:45

## 2022-02-07 RX ADMIN — Medication 30 MILLIGRAM(S): at 20:48

## 2022-02-07 RX ADMIN — SODIUM CHLORIDE 125 MILLILITER(S): 9 INJECTION, SOLUTION INTRAVENOUS at 07:00

## 2022-02-07 RX ADMIN — Medication 1000 MILLIUNIT(S)/MIN: at 18:08

## 2022-02-07 RX ADMIN — ONDANSETRON 4 MILLIGRAM(S): 8 TABLET, FILM COATED ORAL at 10:27

## 2022-02-07 RX ADMIN — BUTORPHANOL TARTRATE 2 MILLIGRAM(S): 2 INJECTION, SOLUTION INTRAMUSCULAR; INTRAVENOUS at 10:00

## 2022-02-07 NOTE — CHART NOTE - NSCHARTNOTEFT_GEN_A_CORE
Ob attending    120 moderate variability accels no decels  toco q irreg  a/p cat 1 fht   patient wishes stadol   continue induction    Karen SANTAMARIA

## 2022-02-07 NOTE — OB RN PATIENT PROFILE - REASON FOR REFUSAL
Patient refused vaccine at MD office due to Tdap vaccine not coverered by insurance.  Patient willing to receive vaccination post delivery if covered by insurance.

## 2022-02-07 NOTE — OB PROVIDER H&P - ASSESSMENT
32 y/o pt 40 weeks  presents with PROM   d/w Dr Dang  Plan:  -Admit l&d. Routine labs  -IOL PO cytotec  -Fetus: cat 1 tracing, vertex presentation, continuous monitoring  -GBS negative  -Pain: patient denies pain management  -Covid 19 negative 22 for patient and support person   -Consents signed and witnessed at bedside

## 2022-02-07 NOTE — OB RN PATIENT PROFILE - FALL HARM RISK - UNIVERSAL INTERVENTIONS
Bed in lowest position, wheels locked, appropriate side rails in place/Call bell, personal items and telephone in reach/Instruct patient to call for assistance before getting out of bed or chair/Non-slip footwear when patient is out of bed/[place] to call system/Physically safe environment - no spills, clutter or unnecessary equipment/Purposeful Proactive Rounding/Room/bathroom lighting operational, light cord in reach

## 2022-02-07 NOTE — OB RN DELIVERY SUMMARY - NSSELHIDDEN_OBGYN_ALL_OB_FT
[NS_DeliveryAttending1_OBGYN_ALL_OB_FT:[id number]] [NS_DeliveryAttending1_OBGYN_ALL_OB_FT:[id number]],[NS_DeliveryRN_OBGYN_ALL_OB_FT:[id number]]

## 2022-02-07 NOTE — DISCHARGE NOTE OB - NS MD DC FALL RISK RISK
For information on Fall & Injury Prevention, visit: [email or web address] OR  [email or web address] OR  https://www.cdc.gov/steadi/patient.html

## 2022-02-07 NOTE — OB PROVIDER H&P - HISTORY OF PRESENT ILLNESS
30 y/o pt 40 weeks  presents to triage with c/o ?LOF, blood-tinged @ 0300 and irregular contractions. pt denies any n/v/d, fever or chills. pt endorses +Fetal movement  IOL on 22 post dates  AP uncomplicated thus far    Allergy: shellfish- anaphylaxis  PMH:  Asthma- last attack >15 years ago  PSH:   Right lumpectomy 10' -benign  OB:  TOP x1   GYN:  denies fibroids/cysts/stds  Social hx:  denies etoh/smoking/illicit drugs   Medications:  PNV

## 2022-02-07 NOTE — OB PROVIDER DELIVERY SUMMARY - NSLACERATRAPAIRDETAILS_OBGYN_ALL_OB_FT
first degree sutured with 3 2-0 chromic interrupted suturespreiurethral sutured with 3-0 chromic continuous lock suture

## 2022-02-07 NOTE — OB RN DELIVERY SUMMARY - NS_SEPSISRSKCALC_OBGYN_ALL_OB_FT
EOS calculated successfully. EOS Risk Factor: 0.5/1000 live births ([facility] national incidence); GA=40w5d; Temp=98.6; ROM=14.733; GBS='Negative'; Antibiotics='No antibiotics or any antibiotics < 2 hrs prior to birth'

## 2022-02-07 NOTE — DISCHARGE NOTE OB - CARE PROVIDER_API CALL
Virginia Arcos (DO)  Obstetrics and Gynecology  [address] [place] [address]  Phone: [phone]  Fax: [phone]  Follow Up Time:

## 2022-02-07 NOTE — OB RN TRIAGE NOTE - FALL HARM RISK - UNIVERSAL INTERVENTIONS
Bed in lowest position, wheels locked, appropriate side rails in place/Call bell, personal items and telephone in reach/Instruct patient to call for assistance before getting out of bed or chair/Non-slip footwear when patient is out of bed/[place] to call system/Physically safe environment - no spills, clutter or unnecessary equipment/Purposeful Proactive Rounding/Room/bathroom lighting operational, light cord in reach

## 2022-02-07 NOTE — OB PROVIDER LABOR PROGRESS NOTE - ASSESSMENT
32 y/o  at 40/5 weeks admitted for PROM @3am  - bedside placement of cervical balloon   - 60cc/60cc instilled  - pt tolerated well  - d/w Dr Mills 
Will call for epidural and then start pitocin.    D/w Dr. Lina Vidal ,PGY-1

## 2022-02-07 NOTE — DISCHARGE NOTE OB - MEDICATION SUMMARY - MEDICATIONS TO TAKE
I will START or STAY ON the medications listed below when I get home from the hospital:    ibuprofen 600 mg oral tablet  -- 1 tab(s) by mouth every 6 hours  -- Indication: For pain    Prenatal Multivitamins with Folic Acid 1 mg oral tablet  -- 1 tab(s) by mouth once a day  -- Indication: For vitamins

## 2022-02-07 NOTE — OB PROVIDER DELIVERY SUMMARY - NSPROVIDERDELIVERYNOTE_OBGYN_ALL_OB_FT
nsd girl  pudendal block  repair of firat degree and periurethral lac  spont delivery of placenta  Immediate pp condition was stable

## 2022-02-07 NOTE — OB RN PATIENT PROFILE - NSICDXPASTMEDICALHX_GEN_ALL_CORE_FT
PAST MEDICAL HISTORY:  Asthma last attack 15 years ago, has not used inhaler in years, hospitalized and intubated as a child.    Benign breast lumps     S/P lumpectomy, right breast 2011

## 2022-02-07 NOTE — OB PROVIDER DELIVERY SUMMARY - DELIVERY COMPLICATIONS; ABNORMAL FETAL HEART RATE
variable decels with pushing but moderate variability and accels excluded ongoing fetal hypoxia, peds at delivery

## 2022-02-07 NOTE — OB PROVIDER H&P - PROBLEM SELECTOR PLAN 1
-Admit l&d. Routine labs  -IOL PO cytotec  -Fetus: cat 1 tracing, vertex presentation, continuous monitoring  -GBS negative  -Pain: patient denies pain management  -Covid 19 negative 02/05/22 for patient and support person   -Consents signed and witnessed at bedside

## 2022-02-07 NOTE — OB NEONATOLOGY/PEDIATRICIAN DELIVERY SUMMARY - NSPEDSNEONOTESA_OBGYN_ALL_OB_FT
Called to Delivery by OB for Category II FHR Tracing. This is a 40 5/7 week female born to a 29 y/o , A+, prenatal labs neg, non-reactive, and immune, GBS neg (1/10), and covid neg (/) via . Maternal history of Asthma, B/l Breast lumps, with lumpectomy of benign Rt breast mass , and TOP x1 with D+C. SROM  at 03:20 am with clear fluids. Infant emerged with good cry and color. W,D,S,S. Apgars 9,9. EOS 0.21. Mother desires breast and bottle feeding and HEP B. Raquel Cardona.

## 2022-02-07 NOTE — OB RN DELIVERY SUMMARY - NS_ADMITROM_OBGYN_ALL_OB
vss. Pain management effective. Discharge instructions given. supplies given. HOA teaching reinforced. escorted home with friend and .
Yes

## 2022-02-07 NOTE — DISCHARGE NOTE OB - PATIENT PORTAL LINK FT
You can access the FollowMyHealth Patient Portal offered by [facility] by registering at the following website: [email or web address]. By joining [employer]’s FollowMyHealth portal, you will also be able to view your health information using other applications (apps) compatible with our system.

## 2022-02-07 NOTE — OB PROVIDER H&P - NSHPPHYSICALEXAM_GEN_ALL_CORE
Vital Signs Last 24 Hrs  T(C): 36.8 (07 Feb 2022 04:48), Max: 36.8 (07 Feb 2022 04:47)  T(F): 98.2 (07 Feb 2022 04:48), Max: 98.24 (07 Feb 2022 04:47)  HR: 82 (07 Feb 2022 04:48) (82 - 82)  BP: 100/56 (07 Feb 2022 04:48) (100/56 - 100/56)  BP(mean): --  RR: 16 (07 Feb 2022 04:48) (16 - 16)  SpO2: --    Abdomen: soft, non tender. no guarding or rebound tenderness  SSE:  +pooling, clear odorless   +nitrazine   +ferning  SVE: 1/50/-3  TAS: vertex presentation  EFW 6015gm by Leopold[employer]    NST in progress

## 2022-02-08 LAB
HBV SURFACE AG SERPL QL IA: SIGNIFICANT CHANGE UP
HIV 1+2 AB+HIV1 P24 AG SERPL QL IA: SIGNIFICANT CHANGE UP
RUBV IGG SER-ACNC: 1.7 INDEX — SIGNIFICANT CHANGE UP
RUBV IGG SER-IMP: POSITIVE — SIGNIFICANT CHANGE UP
T PALLIDUM AB TITR SER: NEGATIVE — SIGNIFICANT CHANGE UP

## 2022-02-08 RX ORDER — IBUPROFEN 200 MG
600 TABLET ORAL EVERY 6 HOURS
Refills: 0 | Status: DISCONTINUED | OUTPATIENT
Start: 2022-02-08 | End: 2022-02-09

## 2022-02-08 RX ORDER — ACETAMINOPHEN 500 MG
650 TABLET ORAL EVERY 6 HOURS
Refills: 0 | Status: DISCONTINUED | OUTPATIENT
Start: 2022-02-08 | End: 2022-02-09

## 2022-02-08 RX ADMIN — Medication 1 TABLET(S): at 12:12

## 2022-02-08 RX ADMIN — Medication 600 MILLIGRAM(S): at 12:13

## 2022-02-08 RX ADMIN — AER TRAVELER 1 APPLICATION(S): 0.5 SOLUTION RECTAL; TOPICAL at 05:39

## 2022-02-08 RX ADMIN — Medication 600 MILLIGRAM(S): at 19:29

## 2022-02-08 RX ADMIN — Medication 975 MILLIGRAM(S): at 10:45

## 2022-02-08 RX ADMIN — Medication 1 APPLICATION(S): at 05:41

## 2022-02-08 RX ADMIN — MAGNESIUM HYDROXIDE 30 MILLILITER(S): 400 TABLET, CHEWABLE ORAL at 14:17

## 2022-02-08 RX ADMIN — Medication 600 MILLIGRAM(S): at 05:43

## 2022-02-08 RX ADMIN — SODIUM CHLORIDE 3 MILLILITER(S): 9 INJECTION INTRAMUSCULAR; INTRAVENOUS; SUBCUTANEOUS at 22:15

## 2022-02-08 RX ADMIN — Medication 1 APPLICATION(S): at 05:40

## 2022-02-08 RX ADMIN — Medication 600 MILLIGRAM(S): at 13:00

## 2022-02-08 RX ADMIN — Medication 600 MILLIGRAM(S): at 18:34

## 2022-02-08 RX ADMIN — SODIUM CHLORIDE 3 MILLILITER(S): 9 INJECTION INTRAMUSCULAR; INTRAVENOUS; SUBCUTANEOUS at 06:12

## 2022-02-08 RX ADMIN — SODIUM CHLORIDE 3 MILLILITER(S): 9 INJECTION INTRAMUSCULAR; INTRAVENOUS; SUBCUTANEOUS at 13:59

## 2022-02-08 RX ADMIN — Medication 975 MILLIGRAM(S): at 11:30

## 2022-02-08 RX ADMIN — Medication 600 MILLIGRAM(S): at 06:10

## 2022-02-08 NOTE — LACTATION INITIAL EVALUATION - LACTATION INTERVENTIONS
initiate/review safe skin-to-skin/initiate/review hand expression/initiate/review techniques for position and latch/post discharge community resources provided/initiate/review breast massage/compression/reviewed components of an effective feeding and at least 8 effective feedings per day required/reviewed importance of monitoring infant diapers, the breastfeeding log, and minimum output each day/reviewed risks of unnecessary formula supplementation/reviewed benefits and recommendations for rooming in/reviewed feeding on demand/by cue at least 8 times a day/reviewed indications of inadequate milk transfer that would require supplementation

## 2022-02-08 NOTE — PROGRESS NOTE ADULT - SUBJECTIVE AND OBJECTIVE BOX
Post partum Day 1      Pt without complaints    Vital Signs Last 24 Hrs  T(C): 36.8 (08 Feb 2022 05:47), Max: 37.0 (07 Feb 2022 06:27)  T(F): 98.3 (08 Feb 2022 05:47), Max: 98.6 (07 Feb 2022 06:27)  HR: 77 (08 Feb 2022 05:47) (57 - 105)  BP: 104/69 (08 Feb 2022 05:47) (4/- - 139/82)  BP(mean): --  RR: 18 (08 Feb 2022 05:47) (16 - 18)  SpO2: 100% (08 Feb 2022 05:47) (90% - 100%)                        12.1   8.11  )-----------( 213      ( 07 Feb 2022 05:48 )             36.6     MEDICATIONS  (STANDING):  acetaminophen     Tablet .. 975 milliGRAM(s) Oral <User Schedule>  diphtheria/tetanus/pertussis (acellular) Vaccine (ADAcel) 0.5 milliLiter(s) IntraMuscular once  ibuprofen  Tablet. 600 milliGRAM(s) Oral every 6 hours  oxytocin Infusion 333.333 milliUNIT(s)/Min (1000 mL/Hr) IV Continuous <Continuous>  prenatal multivitamin 1 Tablet(s) Oral daily  sodium chloride 0.9% lock flush 3 milliLiter(s) IV Push every 8 hours    MEDICATIONS  (PRN):  ALBUTerol    90 MICROgram(s) HFA Inhaler 2 Puff(s) Inhalation every 6 hours PRN Wheezing  benzocaine 20%/menthol 0.5% Spray 1 Spray(s) Topical every 6 hours PRN for Perineal discomfort  dibucaine 1% Ointment 1 Application(s) Topical every 6 hours PRN Perineal discomfort  diphenhydrAMINE 25 milliGRAM(s) Oral every 6 hours PRN Pruritus  hydrocortisone 1% Cream 1 Application(s) Topical every 6 hours PRN Moderate Pain (4-6)  lanolin Ointment 1 Application(s) Topical every 6 hours PRN nipple soreness  magnesium hydroxide Suspension 30 milliLiter(s) Oral two times a day PRN Constipation  oxyCODONE    IR 5 milliGRAM(s) Oral every 3 hours PRN Moderate to Severe Pain (4-10)  oxyCODONE    IR 5 milliGRAM(s) Oral once PRN Moderate to Severe Pain (4-10)  pramoxine 1%/zinc 5% Cream 1 Application(s) Topical every 4 hours PRN Moderate Pain (4-6)  simethicone 80 milliGRAM(s) Chew every 4 hours PRN Gas  witch hazel Pads 1 Application(s) Topical every 4 hours PRN Perineal discomfort      Abdomen soft  fundus firm, non tender  extremities non tender    lochia wnl      Patient doing well  Routine post partum care

## 2022-02-09 VITALS
DIASTOLIC BLOOD PRESSURE: 60 MMHG | HEART RATE: 74 BPM | RESPIRATION RATE: 18 BRPM | SYSTOLIC BLOOD PRESSURE: 113 MMHG | OXYGEN SATURATION: 100 % | TEMPERATURE: 98 F

## 2022-02-09 RX ORDER — TETANUS TOXOID, REDUCED DIPHTHERIA TOXOID AND ACELLULAR PERTUSSIS VACCINE, ADSORBED 5; 2.5; 8; 8; 2.5 [IU]/.5ML; [IU]/.5ML; UG/.5ML; UG/.5ML; UG/.5ML
0.5 SUSPENSION INTRAMUSCULAR ONCE
Refills: 0 | Status: COMPLETED | OUTPATIENT
Start: 2022-02-09 | End: 2022-02-09

## 2022-02-09 RX ADMIN — Medication 600 MILLIGRAM(S): at 07:13

## 2022-02-09 RX ADMIN — Medication 600 MILLIGRAM(S): at 12:30

## 2022-02-09 RX ADMIN — Medication 600 MILLIGRAM(S): at 01:45

## 2022-02-09 RX ADMIN — Medication 1 APPLICATION(S): at 06:36

## 2022-02-09 RX ADMIN — Medication 600 MILLIGRAM(S): at 11:58

## 2022-02-09 RX ADMIN — AER TRAVELER 1 APPLICATION(S): 0.5 SOLUTION RECTAL; TOPICAL at 06:36

## 2022-02-09 RX ADMIN — TETANUS TOXOID, REDUCED DIPHTHERIA TOXOID AND ACELLULAR PERTUSSIS VACCINE, ADSORBED 0.5 MILLILITER(S): 5; 2.5; 8; 8; 2.5 SUSPENSION INTRAMUSCULAR at 11:58

## 2022-02-09 RX ADMIN — Medication 600 MILLIGRAM(S): at 01:14

## 2022-02-09 RX ADMIN — Medication 600 MILLIGRAM(S): at 06:36

## 2022-02-09 RX ADMIN — Medication 1 TABLET(S): at 11:58

## 2022-02-09 NOTE — PROGRESS NOTE ADULT - SUBJECTIVE AND OBJECTIVE BOX
Assessment and Plan    Day  2 Vaginal Delivery  She feels well  Continue the current pain medication  Encourage  Ambulation  Encourage regular diet   DVT ppx: SCDs only when not ambulating  She is stable, tolerates a diet and has normal flatus and bowel movements  She will be discharged on Day 2 according to the normal criteria.

## 2023-01-10 ENCOUNTER — EMERGENCY (EMERGENCY)
Facility: HOSPITAL | Age: 33
LOS: 1 days | Discharge: HOME | End: 2023-01-10
Attending: STUDENT IN AN ORGANIZED HEALTH CARE EDUCATION/TRAINING PROGRAM | Admitting: STUDENT IN AN ORGANIZED HEALTH CARE EDUCATION/TRAINING PROGRAM
Payer: MEDICAID

## 2023-01-10 VITALS
TEMPERATURE: 98 F | DIASTOLIC BLOOD PRESSURE: 62 MMHG | SYSTOLIC BLOOD PRESSURE: 107 MMHG | RESPIRATION RATE: 16 BRPM | OXYGEN SATURATION: 99 % | HEART RATE: 89 BPM

## 2023-01-10 VITALS
SYSTOLIC BLOOD PRESSURE: 113 MMHG | RESPIRATION RATE: 20 BRPM | TEMPERATURE: 98 F | DIASTOLIC BLOOD PRESSURE: 73 MMHG | OXYGEN SATURATION: 99 % | HEART RATE: 82 BPM

## 2023-01-10 PROBLEM — J45.909 UNSPECIFIED ASTHMA, UNCOMPLICATED: Chronic | Status: ACTIVE | Noted: 2021-08-14

## 2023-01-10 PROBLEM — N63.0 UNSPECIFIED LUMP IN UNSPECIFIED BREAST: Chronic | Status: ACTIVE | Noted: 2022-02-07

## 2023-01-10 PROBLEM — Z98.890 OTHER SPECIFIED POSTPROCEDURAL STATES: Chronic | Status: ACTIVE | Noted: 2022-02-07

## 2023-01-10 PROCEDURE — 71045 X-RAY EXAM CHEST 1 VIEW: CPT | Mod: 26

## 2023-01-10 PROCEDURE — 99284 EMERGENCY DEPT VISIT MOD MDM: CPT

## 2023-01-10 RX ORDER — ALBUTEROL 90 UG/1
1 AEROSOL, METERED ORAL ONCE
Refills: 0 | Status: COMPLETED | OUTPATIENT
Start: 2023-01-10 | End: 2023-01-10

## 2023-01-10 RX ADMIN — ALBUTEROL 1 PUFF(S): 90 AEROSOL, METERED ORAL at 14:06

## 2023-01-10 NOTE — ED PROVIDER NOTE - CLINICAL SUMMARY MEDICAL DECISION MAKING FREE TEXT BOX
32 F with cough x 1 week and daughter with recent adenovirus diagnosis presents after episode of post tussive emesis x 1 with blood tinged sputum. Not currently producing any blood tinged sputum. States this has happened to her in the past whenever she has a bad cough. No TB risk factors. Has left sided neck mass currently being worked up by PCP, getting biopsy end of january. Vitals stable, afebrile. exam unremarkable. Likely bronchitis from viral illness with forceful mechanism of coughing producing blood tinged sputum. Plan to get CXR and give albuterol. Will reassess.

## 2023-01-10 NOTE — ED PROVIDER NOTE - OBJECTIVE STATEMENT
32 F no pmh presenting to ED with cough x 1 week and hemoptysis x 1 day. Daughter recent diagnosis with adenovirus. Pts sx began with fever, sore throat, cough, but are now just dry cough. Was coughing spell last night which resulted in post tussive emesis with blood tinged sputum. Has had a few more episodes of blood tinged sputum when coughing since episode, but most recently did not notice any blood. States this has happened to her in the past whenever she has coughing spells. No TB risk factors. Currently feeling well other than dry cough. No fever/chills. Also noted that patient has Left sided neck mass x 4 months that is being worked up by her PCP.

## 2023-01-10 NOTE — ED PROVIDER NOTE - NSFOLLOWUPINSTRUCTIONS_ED_ALL_ED_FT
No signs of emergency medical condition on today's workup.  Presumptive diagnosis made, but further evaluation may be required by your primary care doctor or specialist for a definitive diagnosis.  Therefore, follow up as directed and if symptoms change/worsen or any emergency conditions, please return to the ER.    Cough    Coughing is a reflex that clears your throat and your airways. Coughing helps to heal and protect your lungs. It is normal to cough occasionally, but a cough that happens with other symptoms or lasts a long time may be a sign of a condition that needs treatment. Coughing may be caused by infections, asthma or COPD, smoking, postnasal drip, gastroesophageal reflux, as well as other medical conditions. Take medicines only as instructed by your health care provider. Avoid environments or triggers that causes you to cough at work or at home.    SEEK IMMEDIATE MEDICAL CARE IF YOU HAVE ANY OF THE FOLLOWING SYMPTOMS: coughing up blood, shortness of breath, rapid heart rate, chest pain, unexplained weight loss or night sweats. Use Albuterol Inhaler 2 puffs every 4 to 6 hours as needed for shortness of breath and/or wheezing.     No signs of emergency medical condition on today's workup.  Presumptive diagnosis made, but further evaluation may be required by your primary care doctor or specialist for a definitive diagnosis.  Therefore, follow up as directed and if symptoms change/worsen or any emergency conditions, please return to the ER.    Cough    Coughing is a reflex that clears your throat and your airways. Coughing helps to heal and protect your lungs. It is normal to cough occasionally, but a cough that happens with other symptoms or lasts a long time may be a sign of a condition that needs treatment. Coughing may be caused by infections, asthma or COPD, smoking, postnasal drip, gastroesophageal reflux, as well as other medical conditions. Take medicines only as instructed by your health care provider. Avoid environments or triggers that causes you to cough at work or at home.    SEEK IMMEDIATE MEDICAL CARE IF YOU HAVE ANY OF THE FOLLOWING SYMPTOMS: coughing up blood, shortness of breath, rapid heart rate, chest pain, unexplained weight loss or night sweats.

## 2023-01-10 NOTE — ED PROVIDER NOTE - NS ED ROS FT
Gen: Denies fevers  CV: Denies chest pain  Skin: denies color changes  Resp: +cough   Endo: Denies increased urination  GI: Denies nausea, vomiting  Msk: Denies extremity pain  : Denies dysuria  Neuro: Denies LOC  Psych: Denies mood changes  all other ROS negative unless indicated in HPI

## 2023-01-10 NOTE — ED ADULT TRIAGE NOTE - CHIEF COMPLAINT QUOTE
Pt c/o 1 week of cough and fever. Pt's daughter was diagnosed with adenovirus. Pt states she's been coughing up blood-tinged mucus since yesterday, along with SOB at night.

## 2023-01-10 NOTE — ED PROVIDER NOTE - PATIENT PORTAL LINK FT
You can access the FollowMyHealth Patient Portal offered by [facility] by registering at the following website: [email or web address]. By joining [facility]’s FollowMyHealth portal, you will also be able to view your health information using other applications (apps) compatible with our system.

## 2023-01-10 NOTE — ED PROVIDER NOTE - ATTENDING CONTRIBUTION TO CARE
31 yo F no pmhx presenting with c/o cough in setting of known viral URI one week ago. Pt states she is no longer having fevers and feels overall better but has a lingering dry cough. Reports episode of post tussive emesis and a few episodes of blood tinged sputum. Denies TB rf. Denies cp, no leg pain/leg swelling. Endorses sob when coughing fits occur. On exam, well appearing, NAD, speaking in full sentences, lungs ctab, heart rrr. Plan for xr, albuterol, reassess, likely dc

## 2023-01-10 NOTE — ED PROVIDER NOTE - PHYSICAL EXAMINATION
Gen: WDWN, NAD  HEENT: +Tonsillar erythema. Uvula midline. No tonsillar exudate. No peritonsillar abscess. No blood in oropharynx. No nasal bleeding. EOMI, no nasal discharge, mucous membranes moist  CV: RRR, +S1/S2, no M/R/G, 2+ radial pulses b/l  Resp: CTAB, no W/R/R, no accessory muscle use, no increased work of breathing  GI: Abdomen soft non-distended, NTTP  MSK: No open wounds, no bruising, no LE edema  Neuro: A&Ox4, following commands, moving all four extremities spontaneously  Psych: appropriate mood

## 2023-01-26 NOTE — ED ADULT NURSE NOTE - NS ED NURSE RECORD ANOTHER HT AND WT
3 day Sleep therapy management telephone visit    Diagnostic AHI: 8.8  PSG    Confirmed with patient at time of call- N/A Patient is still interested in STM service       Message left for patient to return call    Order settings:  CPAP MIN CPAP MAX   5 cm H2O 15 cm H2O       Device settings:  CPAP MIN CPAP MAX EPR RESMED SOFT RESPONSE SETTING   5.0 cm  H20 15.0 cm  H20 TWO OFF       Compliance 100 %    Assessment: Nightly usage over four hours      Action plan: Patient to have 14 day STM visit. Patient has a follow up visit scheduled:   no and not required by insurance    Replacement device: No  STM ordered by provider: Yes     Total time spent on accessing and  interpreting remote patient PAP therapy data  10 minutes    Total time spent counseling, coaching  and reviewing PAP therapy data with patient  1 minutes    [id number] no                  Yes

## 2023-04-20 ENCOUNTER — EMERGENCY (EMERGENCY)
Facility: HOSPITAL | Age: 33
LOS: 1 days | Discharge: HOME | End: 2023-04-20
Attending: EMERGENCY MEDICINE | Admitting: EMERGENCY MEDICINE
Payer: MEDICAID

## 2023-04-20 VITALS
HEART RATE: 73 BPM | DIASTOLIC BLOOD PRESSURE: 70 MMHG | TEMPERATURE: 99 F | RESPIRATION RATE: 16 BRPM | OXYGEN SATURATION: 100 % | SYSTOLIC BLOOD PRESSURE: 106 MMHG

## 2023-04-20 VITALS
DIASTOLIC BLOOD PRESSURE: 70 MMHG | HEART RATE: 88 BPM | TEMPERATURE: 98 F | SYSTOLIC BLOOD PRESSURE: 128 MMHG | RESPIRATION RATE: 16 BRPM | OXYGEN SATURATION: 99 %

## 2023-04-20 LAB
24R-OH-CALCIDIOL SERPL-MCNC: 14.5 NG/ML — LOW (ref 30–80)
ALBUMIN SERPL ELPH-MCNC: 4 G/DL — SIGNIFICANT CHANGE UP (ref 3.3–5)
ALP SERPL-CCNC: 35 U/L — LOW (ref 40–120)
ALT FLD-CCNC: 7 U/L — SIGNIFICANT CHANGE UP (ref 4–33)
ANION GAP SERPL CALC-SCNC: 12 MMOL/L — SIGNIFICANT CHANGE UP (ref 7–14)
AST SERPL-CCNC: 17 U/L — SIGNIFICANT CHANGE UP (ref 4–32)
BASOPHILS # BLD AUTO: 0.03 K/UL — SIGNIFICANT CHANGE UP (ref 0–0.2)
BASOPHILS NFR BLD AUTO: 0.4 % — SIGNIFICANT CHANGE UP (ref 0–2)
BILIRUB SERPL-MCNC: 0.3 MG/DL — SIGNIFICANT CHANGE UP (ref 0.2–1.2)
BUN SERPL-MCNC: 7 MG/DL — SIGNIFICANT CHANGE UP (ref 7–23)
CA-I BLD-SCNC: 0.97 MMOL/L — LOW (ref 1.15–1.29)
CALCIUM SERPL-MCNC: 7.8 MG/DL — LOW (ref 8.4–10.5)
CHLORIDE SERPL-SCNC: 104 MMOL/L — SIGNIFICANT CHANGE UP (ref 98–107)
CO2 SERPL-SCNC: 27 MMOL/L — SIGNIFICANT CHANGE UP (ref 22–31)
CREAT SERPL-MCNC: 0.5 MG/DL — SIGNIFICANT CHANGE UP (ref 0.5–1.3)
EGFR: 129 ML/MIN/1.73M2 — SIGNIFICANT CHANGE UP
EOSINOPHIL # BLD AUTO: 0.16 K/UL — SIGNIFICANT CHANGE UP (ref 0–0.5)
EOSINOPHIL NFR BLD AUTO: 2.1 % — SIGNIFICANT CHANGE UP (ref 0–6)
GLUCOSE SERPL-MCNC: 97 MG/DL — SIGNIFICANT CHANGE UP (ref 70–99)
HCT VFR BLD CALC: 37.1 % — SIGNIFICANT CHANGE UP (ref 34.5–45)
HGB BLD-MCNC: 12.1 G/DL — SIGNIFICANT CHANGE UP (ref 11.5–15.5)
IANC: 5.22 K/UL — SIGNIFICANT CHANGE UP (ref 1.8–7.4)
IMM GRANULOCYTES NFR BLD AUTO: 0.1 % — SIGNIFICANT CHANGE UP (ref 0–0.9)
LYMPHOCYTES # BLD AUTO: 1.45 K/UL — SIGNIFICANT CHANGE UP (ref 1–3.3)
LYMPHOCYTES # BLD AUTO: 19.4 % — SIGNIFICANT CHANGE UP (ref 13–44)
MAGNESIUM SERPL-MCNC: 2 MG/DL — SIGNIFICANT CHANGE UP (ref 1.6–2.6)
MCHC RBC-ENTMCNC: 30 PG — SIGNIFICANT CHANGE UP (ref 27–34)
MCHC RBC-ENTMCNC: 32.6 GM/DL — SIGNIFICANT CHANGE UP (ref 32–36)
MCV RBC AUTO: 92.1 FL — SIGNIFICANT CHANGE UP (ref 80–100)
MONOCYTES # BLD AUTO: 0.59 K/UL — SIGNIFICANT CHANGE UP (ref 0–0.9)
MONOCYTES NFR BLD AUTO: 7.9 % — SIGNIFICANT CHANGE UP (ref 2–14)
NEUTROPHILS # BLD AUTO: 5.22 K/UL — SIGNIFICANT CHANGE UP (ref 1.8–7.4)
NEUTROPHILS NFR BLD AUTO: 70.1 % — SIGNIFICANT CHANGE UP (ref 43–77)
NRBC # BLD: 0 /100 WBCS — SIGNIFICANT CHANGE UP (ref 0–0)
NRBC # FLD: 0 K/UL — SIGNIFICANT CHANGE UP (ref 0–0)
PHOSPHATE SERPL-MCNC: 2.2 MG/DL — LOW (ref 2.5–4.5)
PLATELET # BLD AUTO: 261 K/UL — SIGNIFICANT CHANGE UP (ref 150–400)
POTASSIUM SERPL-MCNC: 2.9 MMOL/L — CRITICAL LOW (ref 3.5–5.3)
POTASSIUM SERPL-SCNC: 2.9 MMOL/L — CRITICAL LOW (ref 3.5–5.3)
PROT SERPL-MCNC: 7.4 G/DL — SIGNIFICANT CHANGE UP (ref 6–8.3)
PTH-INTACT FLD-MCNC: 13 PG/ML — LOW (ref 15–65)
RBC # BLD: 4.03 M/UL — SIGNIFICANT CHANGE UP (ref 3.8–5.2)
RBC # FLD: 11.6 % — SIGNIFICANT CHANGE UP (ref 10.3–14.5)
SODIUM SERPL-SCNC: 143 MMOL/L — SIGNIFICANT CHANGE UP (ref 135–145)
VIT D25+D1,25 OH+D1,25 PNL SERPL-MCNC: 20.2 PG/ML — SIGNIFICANT CHANGE UP (ref 19.9–79.3)
WBC # BLD: 7.46 K/UL — SIGNIFICANT CHANGE UP (ref 3.8–10.5)
WBC # FLD AUTO: 7.46 K/UL — SIGNIFICANT CHANGE UP (ref 3.8–10.5)

## 2023-04-20 PROCEDURE — 99284 EMERGENCY DEPT VISIT MOD MDM: CPT

## 2023-04-20 PROCEDURE — 93010 ELECTROCARDIOGRAM REPORT: CPT

## 2023-04-20 RX ORDER — SODIUM,POTASSIUM PHOSPHATES 278-250MG
1 POWDER IN PACKET (EA) ORAL ONCE
Refills: 0 | Status: COMPLETED | OUTPATIENT
Start: 2023-04-20 | End: 2023-04-20

## 2023-04-20 RX ORDER — CALCIUM GLUCONATE 100 MG/ML
2 VIAL (ML) INTRAVENOUS ONCE
Refills: 0 | Status: COMPLETED | OUTPATIENT
Start: 2023-04-20 | End: 2023-04-20

## 2023-04-20 RX ORDER — POTASSIUM CHLORIDE 20 MEQ
10 PACKET (EA) ORAL
Refills: 0 | Status: COMPLETED | OUTPATIENT
Start: 2023-04-20 | End: 2023-04-20

## 2023-04-20 RX ORDER — POTASSIUM CHLORIDE 20 MEQ
40 PACKET (EA) ORAL ONCE
Refills: 0 | Status: COMPLETED | OUTPATIENT
Start: 2023-04-20 | End: 2023-04-20

## 2023-04-20 RX ADMIN — Medication 1 PACKET(S): at 13:21

## 2023-04-20 RX ADMIN — Medication 100 MILLIEQUIVALENT(S): at 13:21

## 2023-04-20 RX ADMIN — Medication 100 MILLIEQUIVALENT(S): at 14:24

## 2023-04-20 RX ADMIN — Medication 40 MILLIEQUIVALENT(S): at 13:21

## 2023-04-20 RX ADMIN — Medication 10 MILLIEQUIVALENT(S): at 14:21

## 2023-04-20 RX ADMIN — Medication 200 GRAM(S): at 12:18

## 2023-04-20 RX ADMIN — Medication 2 GRAM(S): at 12:48

## 2023-04-20 NOTE — ED PROVIDER NOTE - CLINICAL SUMMARY MEDICAL DECISION MAKING FREE TEXT BOX
Pt is a 33 y/o F PMHx papillary thyroid cancer status post thyroidectomy and neck dissection on 4/17/23 with Dr. Garcia at [facility] p/w paresthesias since last night.  Pt reports the morning after her recent procedure, pt developed paresthesias throughout her body as well as cramping of bilateral hands.  She states while in the hospital her PTH and calcium were found to be low.  She states her calcium was supplemented and pt was discharged with a prescription for calcium supplements and calcitriol.  She states that the pharmacy where prescriptions were sent to do not accept her insurance.  She states that she requested transfer of these prescriptions to another pharmacy.  She states that while waiting in the ED today, she was notified that her prescriptions are now ready for  at her preferred pharmacy.  Pt presented to ED today because she developed paresthesias of her face and cramping of bilateral hands yesterday evening.  She states she has a follow up appointment with Dr. Garcia tomorrow.  Pt denies any fevers, chills, nausea, vomiting, chest pain, SOB, difficulty swallowing, diarrhea, constipation, numbness, changes in vision/hearing, palpitations, passing out.  This is a pt with likely hypocalcemia.  Will check PTH, calcium, and EKG.  Disposition pending work up.

## 2023-04-20 NOTE — ED ADULT TRIAGE NOTE - CHIEF COMPLAINT QUOTE
Pt h/o thyroid ca, had thyroidectomy on Monday, sent in for low calcium, endorses severe cramping throughout body, tingling to face, polyuria.

## 2023-04-20 NOTE — ED PROVIDER NOTE - PROGRESS NOTE DETAILS
JORDAN RAHMAN:  Pt reports significant improvement of symptoms.  Pt medically stable for discharge.  Strict return precautions given.  Pt to follow up with ENT tomorrow.  Reassessment performed and plan for discharge discussed with Dr. Cohen who agrees with disposition and discharge plan.

## 2023-04-20 NOTE — ED PROVIDER NOTE - OBJECTIVE STATEMENT
Pt is a 33 y/o F PMHx papillary thyroid cancer status post thyroidectomy and neck dissection on 4/17/23 with Dr. Garcia at [facility] p/w paresthesias since last night.  Pt reports the morning after her recent procedure, pt developed paresthesias throughout her body as well as cramping of bilateral hands.  She states while in the hospital her PTH and calcium were found to be low.  She states her calcium was supplemented and pt was discharged with a prescription for calcium supplements and calcitriol.  She states that the pharmacy where prescriptions were sent to do not accept her insurance.  She states that she requested transfer of these prescriptions to another pharmacy.  She states that while waiting in the ED today, she was notified that her prescriptions are now ready for  at her preferred pharmacy.  Pt presented to ED today because she developed paresthesias of her face and cramping of bilateral hands yesterday evening.  She states she has a follow up appointment with Dr. Garcia tomorrow.  Pt denies any fevers, chills, nausea, vomiting, chest pain, SOB, difficulty swallowing, diarrhea, constipation, numbness, changes in vision/hearing, palpitations, passing out. Pt is a 31 y/o F PMHx papillary thyroid cancer status post thyroidectomy and neck dissection on 4/17/23 with Dr. Garcia at [facility] p/w paresthesias since last night.  Pt reports the morning after her recent procedure, pt developed paresthesias throughout her body as well as cramping of bilateral hands.  She states while in the hospital her PTH and calcium were found to be low.  She states her calcium was supplemented and pt was discharged with a prescription for calcium supplements and calcitriol.  She states that the pharmacy where prescriptions were sent to do not accept her insurance.  She states that she requested transfer of these prescriptions to another pharmacy.  She states that while waiting in the ED today, she was notified that her prescriptions are now ready for  at her preferred pharmacy.  Pt presented to ED today because she developed paresthesias of her face and cramping of bilateral hands yesterday evening.  She states she has a follow up appointment with Dr. Garcia tomorrow.  Pt denies any fevers, chills, nausea, vomiting, chest pain, SOB, difficulty swallowing, diarrhea, constipation, numbness, changes in vision/hearing, palpitations, passing out.    Attending/Vignesh: 31 yo F as described above, s/p thyroidectomy on 4/17 at [facility] p/w initially perioral and fingertip paresthesias and now muscle cramping. Rhett rodriguez in vison, CP, SOB, abd pain or palp.

## 2023-04-20 NOTE — ED PROVIDER NOTE - NSFOLLOWUPINSTRUCTIONS_ED_ALL_ED_FT
Advance activity as tolerated.  Continue all previously prescribed medications as directed unless otherwise instructed.  Follow up with your primary care physician in 48-72 hours and your otolaryngologist Dr. Garcia tomorrow at your appointment- bring copies of your results.  Return to the ER for worsening or persistent symptoms, including but not limited to worsening/persistent cramping, numbness, weakness, tingling, chest pain, shortness of breath, difficulty walking, palpitations, passing out, and/or ANY NEW OR CONCERNING SYMPTOMS. If you have issues obtaining follow up, please call: [phone] to obtain a doctor or specialist who takes your insurance in your area.  You may call [phone] to make an appointment with the internal medicine clinic.

## 2023-04-20 NOTE — ED PROVIDER NOTE - PHYSICAL EXAMINATION
Anterior neck:  +horizontal sutured surgical site w/o redness, tenderness, warmth, discharge, swelling or crepitus Anterior neck:  +horizontal sutured surgical site w/o redness, tenderness, warmth, discharge, swelling or crepitus    Attending/Vignesh: Well-appearing, NAD; PERRL/EOMI, non-icterus, supple, no LISANDRA, no JVD, RRR, CTAB; Abd-soft, NT/ND, no HSM; no LE edema, A&Ox3, nonfocal; Skin-warm/dry/surgical site-C/D/I

## 2023-04-20 NOTE — ED ADULT NURSE NOTE - OBJECTIVE STATEMENT
Pt presents to ED brought in by EMS for hypocalcemia. Pt states on Monday she had a thyroidectomy. States she was unable to  her calcitriol at the pharmacy. States today at 10am she developed generalized intermittent body cramps. Pt states she has had hypocalcemia before and this feels exactly the same. Denies any other medical complaints. Trousseau's sign observed when tourniquet placed on right arm. 20G Marlon placed in right arm. Flushed well with 10cc NS with good blood return. No s/s discomfort or erythema at insertion site. Labs sent. call bell within reach. Education provided to pt on how to and when to use call bell for assistance. Will continue to monitor.

## 2023-04-20 NOTE — ED PROVIDER NOTE - NS ED ATTENDING STATEMENT MOD
This was a shared visit with the CINDI. I reviewed and verified the documentation and independently performed the documented:

## 2023-04-20 NOTE — ED PROVIDER NOTE - PATIENT PORTAL LINK FT
You can access the FollowMyHealth Patient Portal offered by [facility] by registering at the following website: [email or web address]. By joining [facility]’s FollowMyHealth portal, you will also be able to view your health information using other applications (apps) compatible with our system.

## 2023-04-21 NOTE — OB RN TRIAGE NOTE - FALL HARM RISK - UNIVERSAL INTERVENTIONS
Called patient with appointment reminder. No answer, left message with info and phone number to call back if unable to keep appt.    Bed in lowest position, wheels locked, appropriate side rails in place/Call bell, personal items and telephone in reach/Instruct patient to call for assistance before getting out of bed or chair/Non-slip footwear when patient is out of bed/[place] to call system/Physically safe environment - no spills, clutter or unnecessary equipment/Purposeful Proactive Rounding/Room/bathroom lighting operational, light cord in reach

## 2023-04-23 NOTE — ED POST DISCHARGE NOTE - REASON FOR FOLLOW-UP
Other Vit D 14.5. Patient contacted  discussed with patient low vit D and to follow up with MD for supplement.

## 2023-05-11 NOTE — OB RN TRIAGE NOTE - COMFORT/ACCEPTABLE PAIN LEVEL (0-10)
5/11/2023       RE: Hanh Villalba  [address]  [place] [address]     Dear Colleague,    Thank you for referring your patient, Hanh Villalba, to the [facility] ENDOCRINOLOGY [facility]INIC [place] [facility]. Please see a copy of my visit note below.    Endocrinology Clinic Visit 5/11/2023      Video-Visit Details    Type of service:  Video Visit    Joined the call at 5/11/2023, 9:39:29 am.  Left the call at 5/11/2023, 10: 20:51 am.    Originating Location (pt. Location): Home        Distant Location (provider location):  Off-site    Mode of Communication:  Video Conference via [employer]    Physician has received verbal consent for a Video Visit from the patient? Yes    I spent a total of 60 minutes on the date of encounter reviewing medical records, evaluating the patient, coordinating care and documenting in the EHR, as detailed above.      NAME:  Hanh Villalba  PCP:  Angeli Nolan  MRN:  [id number]  Reason for Consult:  Primary hyperparathyroidism  Requesting Provider:  Angeli Nolan    Chief Complaint     Chief Complaint   Patient presents with    Video Visit     New Patient- patient has concerns regarding kidney/thyroid trouble. Patient wondering if something can be done to treat all her concerns. Patient notes she has experiencing hair loss.        History of Present Illness     Hanh Villalba is a 70 year old female who is seen in video visit for primary hyperparathyroidism      She has a PMH of chronic hypothyroidism on lt4 and uterine cancer s/p TAHBSO 3 years ago. She noted high calcium back in 2020, work up started in December. She never felt any different it was just noted on routine BMP. Based on chart reviewed ca was 10.4 on 9/2019. Previously normal.    Symptoms of hypercalcemia: no constipation, no dyspepsia, no mental status changes/lethargy, no polyuria.    Calcium intake: Dietary: limited  diary intake, Supplements: stopped since 2020    Vitamin D intake: Supplements: 2000 international unit(s) daily    Previous fractures: No  Family history of fragility fracture in parent: No  History of kidney stones: No  History of kidney disease: Yes: CKD stage 3a. I reviewed her old record from . She had GFR of > 60 in 2005, since 2008 her GFR ranging between mid 40s and mid 50s.  Family history of any calcium problems or kidney stones: No  History of vitamin D deficiency: No  History of HCTZ use: No  History of Lithium use: No  History of malabsorption (IBD, Celiac, gastric bypass ): No  History of thyroid disease: Yes: controlled hypothyroidism  Weight history: stable.       Work up:    dexa scan 12/2022: lowest Ts core -1.2. no significant change in bone density of the lumbar spine. There has been significant decrease in bone density of the hip.     Latest Reference Range & Units 11/07/22 12:50 11/30/22 11:48 12/12/22 09:54 12/12/22 16:00 05/04/23 13:34   Creatinine 0.51 - 0.95 mg/dL 1.25 (H)  1.10 (H)  1.27 (H)   GFR Estimate >60 mL/min/1.73m2 46 (L)  54 (L)  46 (L)   Calcium 8.8 - 10.2 mg/dL 10.4 (H)  11.1 (H)  10.3 (H)   Phosphorus 2.5 - 4.5 mg/dL  3.0   2.9   25 OH Vit D total 20 - 75 ug/L     <57   Calcium Ionized Whole Blood 4.4 - 5.2 mg/dL 5.5 (H)       Calcium Urine g/24 h 0.10 - 0.30 g/spec    0.22    Calcium Urine mg/dL mg/dL    6.5    Parathyroid Hormone Intact 15 - 65 pg/mL  86 (H)   74 (H)         Social: she is retired . quit smoking longtime ago. Alcohol occasional.  Problem List     Patient Active Problem List   Diagnosis    Hypothyroidism due to acquired atrophy of thyroid    Recurrent major depressive disorder, in full remission (H)    Endometrioid adenocarcinoma of uterus (H)    Overweight (H)    Stage 3a chronic kidney disease (H)    History of colonic polyps        Medications     Current Outpatient Medications   Medication    Apple Cider Vinegar 600 MG CAPS     cholecalciferol (VITAMIN D3) 5000 units TABS tablet    clobetasol (TEMOVATE) 0.05 % external ointment    Cyanocobalamin 1500 MCG TBDP    FLUoxetine (PROZAC) 20 MG capsule    Lactobacillus (ACIDOPHILUS PO)    levothyroxine (SYNTHROID/LEVOTHROID) 100 MCG tablet    Lutein 20 MG CAPS    CALCIUM PO    Omega 3-6-9 Fatty Acids (OMEGA-3-6-9 PO)     No current facility-administered medications for this visit.     Facility-Administered Medications Ordered in Other Visits   Medication    sodium chloride (PF) 0.9% PF flush 10 mL        Allergies     No Known Allergies    Medical / Surgical History     Past Medical History:   Diagnosis Date    Cancer (H) August 2019    Uterus removed    Depression     Depressive disorder 1999    taking prosac    Hypothyroidism     Overweight      Past Surgical History:   Procedure Laterality Date    ABDOMEN SURGERY  Oct 2019    Robotic Hysterectomy    BIOPSY  early 2000's    left breast - non event    COLONOSCOPY  within last 6 years ?    OK    COLONOSCOPY N/A 7/26/2021    Procedure: COLONOSCOPY, WITH POLYPECTOMY;  Surgeon: Ryan Butterfield MD;  Location: [facility] OR    DAVINCI HYSTERECTOMY TOTAL, BILATERAL SALPINGO-OOPHORECTOMY, NODE DISSECTION, COMBINED N/A 10/4/2019    Procedure: Robot-assisted total laparoscopic hysterectomy, Bilateral salpingectomy and Right Oophorectomy, Lysis of adhesion, Cervical Indocyanine Green injection, Bilateral sentinel lymph node dissection, Repair of vaginal laceration.;  Surgeon: Perez Reddy MD;  Location: [facility] OR    DILATION AND CURETTAGE N/A 9/12/2019    Procedure: DILATION AND CURETTAGE, UTERUS;  Surgeon: Navdeep Barajas MD;  Location: [facility] OR    OPERATIVE HYSTEROSCOPY WITH MORCELLATOR N/A 9/12/2019    Procedure: HYSTEROSCOPY WITH MORCELLATOR (MYOSURE);  Surgeon: Navdeep Barajas MD;  Location: [facility] OR    SALPINGO OOPHORECTOMY,R/L/SHANDA Left 1980s    Left ovarian with endometriois    US BREAST BIOPSY LT Left        Social History     Social  History     Socioeconomic History    Marital status: Single     Spouse name: Not on file    Number of children: Not on file    Years of education: Not on file    Highest education level: Not on file   Occupational History    Occupation: retired     Comment:    Tobacco Use    Smoking status: Former     Packs/day: 1.00     Years: 15.00     Pack years: 15.00     Types: Cigarettes     Start date: 1972     Quit date: 1987     Years since quittin.5    Smokeless tobacco: Never   Vaping Use    Vaping status: Not on file   Substance and Sexual Activity    Alcohol use: Yes     Comment: a beer here and there - never more than 2 -  2 times/week    Drug use: Never    Sexual activity: Not Currently     Partners: Female     Birth control/protection: None   Other Topics Concern    Parent/sibling w/ CABG, MI or angioplasty before 65F 55M? Yes   Social History Narrative    Not on file     Social Determinants of Health     Financial Resource Strain: Not on file   Food Insecurity: Not on file   Transportation Needs: Not on file   Physical Activity: Not on file   Stress: Not on file   Social Connections: Not on file   Intimate Partner Violence: Not At Risk (2022)    Humiliation, Afraid, Rape, and Kick questionnaire     Fear of Current or Ex-Partner: No     Emotionally Abused: No     Physically Abused: No     Sexually Abused: No   Housing Stability: Not on file       Family History     Family History   Problem Relation Age of Onset    Acute myelogenous leukemia Mother     Coronary Artery Disease Mother         bypass surgery     Osteoporosis Mother     Obesity Mother     Parkinsonism Father     Prostate Cancer Father         Diagnosed late in life in his 80's    Obesity Sister     Heart Disease Sister     Coronary Artery Disease Sister         heart attacK in 2019    Uterine Cancer Maternal Grandmother 40        Uterine versus cervical    No Known Problems Sister     Leukemia  Brother     Leukemia Nephew     Coronary Artery Disease Brother         Carotid Artery Surgery    Obesity Brother     Substance Abuse Brother         alcohol       ROS     12 ROS completed, pertinent positive and negative in HPI  Answers for HPI/ROS submitted by the patient on 5/5/2023  General Symptoms: No  Skin Symptoms: No  HENT Symptoms: No  EYE SYMPTOMS: No  HEART SYMPTOMS: No  LUNG SYMPTOMS: No  INTESTINAL SYMPTOMS: No  URINARY SYMPTOMS: No  GYNECOLOGIC SYMPTOMS: No  BREAST SYMPTOMS: No  SKELETAL SYMPTOMS: Yes  BLOOD SYMPTOMS: No  NERVOUS SYSTEM SYMPTOMS: No  MENTAL HEALTH SYMPTOMS: No  Swollen joints: Yes  Muscle cramps: Yes  Joint stiffness: Yes      Physical Exam   LMP  (LMP Unknown)    GENERAL: Healthy, alert and no distress  EYES: Eyes grossly normal to inspection.  No discharge or erythema, or obvious scleral/conjunctival abnormalities.  RESP: No audible wheeze, cough, or visible cyanosis.  No visible retractions or increased work of breathing.    SKIN: Visible skin clear. No significant rash, abnormal pigmentation or lesions.  NEURO: Cranial nerves grossly intact.  Mentation and speech appropriate for age.  PSYCH: Mentation appears normal, affect normal/bright, judgement and insight intact, normal speech and appearance well-groomed.     Labs/Imaging     Pertinent Labs were reviewed and updated in EPIC and discussed briefly.  Radiology Results were  reviewed and updated in EPIC and discussed briefly.    Summary of recent findings:   No results found for: A1C    TSH   Date Value Ref Range Status   11/07/2022 1.03 0.40 - 4.00 mU/L Final   06/25/2021 1.51 0.40 - 4.00 mU/L Final   07/17/2020 0.84 0.40 - 4.00 mU/L Final   07/19/2019 1.24 0.40 - 4.00 mU/L Final   05/04/2017 0.97 0.30 - 4.50 uIU/mL Final       Creatinine   Date Value Ref Range Status   05/04/2023 1.27 (H) 0.51 - 0.95 mg/dL Final   06/25/2021 1.04 0.52 - 1.04 mg/dL Final       Recent Labs   Lab Test 09/13/22  1553 06/25/21  0715   CHOL 206* 240*    HDL 46* 40*   * 177*   TRIG 86 113       No results found for: [id number], [id number], [id number]    I personally reviewed the patient's outside records from [facility] EMR and Care Everywhere. Summary of pertinent findings in HPI.    Impression / Plan     1. Hypercalcemia.   2. Elevated PTH  3. CKD 3a  With elevated PTH, the most likely diagnosis is primary hyperparathyroidism. Unlikely to be due to CKD given elevated calcium. Her 24 hr urine collection for calcium was 220 mg, with an intermediate ratio. It was done while on very low calcium intake which would explain the results. We could consider repeating it.    We dicussed today the latest guidelines conclude that surgery for hyperparathyroidism is indicated in symptomatic patients, or in asymptomatic patients who meet any one of the following conditions:  Serum calcium concentration of 1.0 mg/dL (0.25 mmol/L) or more above the upper limit of normal   Creatnine clearance reduced to < 60 mL/min, or 24-hour urine for calcium > 400 mg/day and increased stone risk by biochemical stone risk analysis, or nephrolithiasis or nephrocalcinosis on imaging studyCreatinine clearance that is reduced to <60 mL/min   Bone density at the hip, lumbar spine, or distal radius that is more than 2.5 standard deviations below peak bone mass (T score <-2.5) and/or previous fragility fracture   Age less than 50 years  Operative intervention can be delayed in patients over 50 years of age who are asymptomatic or minimally symptomatic and who have serum calcium concentrations <1.0 mg/dL (0.2 mmol/L) above the upper limit of normal, and in patients who are medically unfit for surgery.        Her CKD is of unclear etiology started since 2008, unlikely to be related to current problems with elevated calcium dn PTH. We discussed that ongoing primary hyperparathyroidism could increase the risk of GFR decline in the future; at this point now it seems to be steady around 45-55.   She had  one level of calcium of 11.1 which corrected down to 10.4.   She really does not have clear cut indication for surgery now. We discussed monitoring with close labs [id number].  She would like to see a nephrologist to discuss her CKD. We discussed that usually no major intervention would happen at stage 3a especially in her case where it is holding steady. I will check her urine protein and place referral for her.     As far as calcium intake, even in cases of hyperparathyroidism, it is recommended to keep up calcium intake to about 1000 mg daily, to prevent further increase in PTH and bone disease. I advised that to the patient.    Test and/or medications prescribed today:  No orders of the defined types were placed in this encounter.        Follow up: 6 month      Felice Madera MD  Endocrinology, Diabetes and Metabolism  [facility]     5

## 2023-07-26 ENCOUNTER — EMERGENCY (EMERGENCY)
Facility: HOSPITAL | Age: 33
LOS: 1 days | Discharge: HOME | End: 2023-07-26
Attending: STUDENT IN AN ORGANIZED HEALTH CARE EDUCATION/TRAINING PROGRAM | Admitting: STUDENT IN AN ORGANIZED HEALTH CARE EDUCATION/TRAINING PROGRAM
Payer: MEDICAID

## 2023-07-26 VITALS
DIASTOLIC BLOOD PRESSURE: 97 MMHG | SYSTOLIC BLOOD PRESSURE: 139 MMHG | RESPIRATION RATE: 17 BRPM | OXYGEN SATURATION: 100 % | HEART RATE: 108 BPM | TEMPERATURE: 99 F

## 2023-07-26 LAB
ANION GAP SERPL CALC-SCNC: 12 MMOL/L — SIGNIFICANT CHANGE UP (ref 7–14)
APPEARANCE UR: ABNORMAL
B PERT DNA SPEC QL NAA+PROBE: SIGNIFICANT CHANGE UP
B PERT+PARAPERT DNA PNL SPEC NAA+PROBE: SIGNIFICANT CHANGE UP
BACTERIA # UR AUTO: ABNORMAL /HPF
BILIRUB UR-MCNC: NEGATIVE — SIGNIFICANT CHANGE UP
BORDETELLA PARAPERTUSSIS (RAPRVP): SIGNIFICANT CHANGE UP
BUN SERPL-MCNC: 5 MG/DL — LOW (ref 7–23)
C PNEUM DNA SPEC QL NAA+PROBE: SIGNIFICANT CHANGE UP
CALCIUM SERPL-MCNC: 8.7 MG/DL — SIGNIFICANT CHANGE UP (ref 8.4–10.5)
CAST: 0 /LPF — SIGNIFICANT CHANGE UP (ref 0–4)
CHLORIDE SERPL-SCNC: 100 MMOL/L — SIGNIFICANT CHANGE UP (ref 98–107)
CO2 SERPL-SCNC: 21 MMOL/L — LOW (ref 22–31)
COLOR SPEC: YELLOW — SIGNIFICANT CHANGE UP
CREAT SERPL-MCNC: 0.47 MG/DL — LOW (ref 0.5–1.3)
DIFF PNL FLD: NEGATIVE — SIGNIFICANT CHANGE UP
EGFR: 130 ML/MIN/1.73M2 — SIGNIFICANT CHANGE UP
FLUAV SUBTYP SPEC NAA+PROBE: SIGNIFICANT CHANGE UP
FLUBV RNA SPEC QL NAA+PROBE: SIGNIFICANT CHANGE UP
GLUCOSE SERPL-MCNC: 122 MG/DL — HIGH (ref 70–99)
GLUCOSE UR QL: NEGATIVE MG/DL — SIGNIFICANT CHANGE UP
HADV DNA SPEC QL NAA+PROBE: SIGNIFICANT CHANGE UP
HCOV 229E RNA SPEC QL NAA+PROBE: SIGNIFICANT CHANGE UP
HCOV HKU1 RNA SPEC QL NAA+PROBE: SIGNIFICANT CHANGE UP
HCOV NL63 RNA SPEC QL NAA+PROBE: SIGNIFICANT CHANGE UP
HCOV OC43 RNA SPEC QL NAA+PROBE: SIGNIFICANT CHANGE UP
HCT VFR BLD CALC: 40.6 % — SIGNIFICANT CHANGE UP (ref 34.5–45)
HGB BLD-MCNC: 13.4 G/DL — SIGNIFICANT CHANGE UP (ref 11.5–15.5)
HMPV RNA SPEC QL NAA+PROBE: SIGNIFICANT CHANGE UP
HPIV1 RNA SPEC QL NAA+PROBE: SIGNIFICANT CHANGE UP
HPIV2 RNA SPEC QL NAA+PROBE: SIGNIFICANT CHANGE UP
HPIV3 RNA SPEC QL NAA+PROBE: SIGNIFICANT CHANGE UP
HPIV4 RNA SPEC QL NAA+PROBE: SIGNIFICANT CHANGE UP
KETONES UR-MCNC: 80 MG/DL
LEUKOCYTE ESTERASE UR-ACNC: ABNORMAL
M PNEUMO DNA SPEC QL NAA+PROBE: SIGNIFICANT CHANGE UP
MAGNESIUM SERPL-MCNC: 2 MG/DL — SIGNIFICANT CHANGE UP (ref 1.6–2.6)
MCHC RBC-ENTMCNC: 30.2 PG — SIGNIFICANT CHANGE UP (ref 27–34)
MCHC RBC-ENTMCNC: 33 GM/DL — SIGNIFICANT CHANGE UP (ref 32–36)
MCV RBC AUTO: 91.4 FL — SIGNIFICANT CHANGE UP (ref 80–100)
NITRITE UR-MCNC: NEGATIVE — SIGNIFICANT CHANGE UP
NRBC # BLD: 0 /100 WBCS — SIGNIFICANT CHANGE UP (ref 0–0)
NRBC # FLD: 0 K/UL — SIGNIFICANT CHANGE UP (ref 0–0)
PH UR: 6 — SIGNIFICANT CHANGE UP (ref 5–8)
PHOSPHATE SERPL-MCNC: 2.7 MG/DL — SIGNIFICANT CHANGE UP (ref 2.5–4.5)
PLATELET # BLD AUTO: 198 K/UL — SIGNIFICANT CHANGE UP (ref 150–400)
POTASSIUM SERPL-MCNC: 3.5 MMOL/L — SIGNIFICANT CHANGE UP (ref 3.5–5.3)
POTASSIUM SERPL-SCNC: 3.5 MMOL/L — SIGNIFICANT CHANGE UP (ref 3.5–5.3)
PROT UR-MCNC: 30 MG/DL
RAPID RVP RESULT: DETECTED
RBC # BLD: 4.44 M/UL — SIGNIFICANT CHANGE UP (ref 3.8–5.2)
RBC # FLD: 11.8 % — SIGNIFICANT CHANGE UP (ref 10.3–14.5)
RBC CASTS # UR COMP ASSIST: 7 /HPF — HIGH (ref 0–4)
REVIEW: SIGNIFICANT CHANGE UP
RSV RNA SPEC QL NAA+PROBE: SIGNIFICANT CHANGE UP
RV+EV RNA SPEC QL NAA+PROBE: DETECTED
SARS-COV-2 RNA SPEC QL NAA+PROBE: SIGNIFICANT CHANGE UP
SODIUM SERPL-SCNC: 133 MMOL/L — LOW (ref 135–145)
SP GR SPEC: 1.03 — SIGNIFICANT CHANGE UP (ref 1–1.03)
SQUAMOUS # UR AUTO: 20 /HPF — HIGH (ref 0–5)
UROBILINOGEN FLD QL: 1 MG/DL — SIGNIFICANT CHANGE UP (ref 0.2–1)
WBC # BLD: 11.15 K/UL — HIGH (ref 3.8–10.5)
WBC # FLD AUTO: 11.15 K/UL — HIGH (ref 3.8–10.5)
WBC UR QL: 30 /HPF — HIGH (ref 0–5)

## 2023-07-26 PROCEDURE — 99284 EMERGENCY DEPT VISIT MOD MDM: CPT

## 2023-07-26 PROCEDURE — 93010 ELECTROCARDIOGRAM REPORT: CPT

## 2023-07-26 RX ORDER — KETOROLAC TROMETHAMINE 30 MG/ML
15 SYRINGE (ML) INJECTION ONCE
Refills: 0 | Status: DISCONTINUED | OUTPATIENT
Start: 2023-07-26 | End: 2023-07-26

## 2023-07-26 RX ORDER — ONDANSETRON 8 MG/1
4 TABLET, FILM COATED ORAL ONCE
Refills: 0 | Status: COMPLETED | OUTPATIENT
Start: 2023-07-26 | End: 2023-07-26

## 2023-07-26 RX ORDER — SODIUM CHLORIDE 9 MG/ML
1000 INJECTION INTRAMUSCULAR; INTRAVENOUS; SUBCUTANEOUS ONCE
Refills: 0 | Status: COMPLETED | OUTPATIENT
Start: 2023-07-26 | End: 2023-07-26

## 2023-07-26 RX ADMIN — Medication 15 MILLIGRAM(S): at 13:16

## 2023-07-26 RX ADMIN — SODIUM CHLORIDE 1000 MILLILITER(S): 9 INJECTION INTRAMUSCULAR; INTRAVENOUS; SUBCUTANEOUS at 12:45

## 2023-07-26 RX ADMIN — ONDANSETRON 4 MILLIGRAM(S): 8 TABLET, FILM COATED ORAL at 12:46

## 2023-07-26 RX ADMIN — Medication 15 MILLIGRAM(S): at 12:46

## 2023-07-26 NOTE — ED PROVIDER NOTE - OBJECTIVE STATEMENT
34 yo F with h/o thyroidectomy in 04/2023 who presents to the ED with fever that began last night. Tmax at home 103 F. Pt has been taking tylenol at home for the fever with last dose this morning. She is also endorsing nausea, one episode of vomiting today, sore throat and generalized myalgias. Her daughter recently had coxsackie virus. Pt called her PMD and endocrinologist today and they both suggested that she come to the ED. No chest pain, SOB, diarrhea, constipation, abdominal pain, cough, runny nose.

## 2023-07-26 NOTE — ED PROVIDER NOTE - PATIENT PORTAL LINK FT
You can access the FollowMyHealth Patient Portal offered by [facility] by registering at the following website: [email or web address]. By joining [facility]’s FollowMyHealth portal, you will also be able to view your health information using other applications (apps) compatible with our system.

## 2023-07-26 NOTE — ED PROVIDER NOTE - PROGRESS NOTE DETAILS
UA with leuks, wbc and bacteria but contaminated with 20 epithelial cells. Pt has no symptoms of UTI so will not treat. Other labs pending. Swab + for rhinovirus. Otherwise work-up unremarkable. Pt re-assessed, feels improved. Recommended outpatient treatment with tylenol/ibuprofen and symptomatic treatment. Pt agrees with this plan. Return precautions given.

## 2023-07-26 NOTE — ED PROVIDER NOTE - CLINICAL SUMMARY MEDICAL DECISION MAKING FREE TEXT BOX
32 yo F with h/o thyroidectomy in 04/2023 who presents to the ED with fever, sore throat, generalized myalgias and nausea that began last night. Pt also endorses one episode of emesis this morning. Was referred to the ED by her PMD and endocrinologist.   Differential includes but is not limited to viral syndrome (possible coxsackie as her daughter recently had it), UTI. No cough or respiratory symptoms to suggest pneumonia. Plan for basic labs, IV fluids, zofran, toradol 34 yo F with h/o thyroidectomy in 04/2023 who presents to the ED with fever, sore throat, generalized myalgias and nausea that began last night. Pt also endorses one episode of emesis this morning. Was referred to the ED by her PMD and endocrinologist.   Differential includes but is not limited to viral syndrome (possible coxsackie as her daughter recently had it), UTI. No cough or respiratory symptoms to suggest pneumonia. Plan for basic labs, IV fluids, zofran, toradol.

## 2023-07-26 NOTE — ED ADULT TRIAGE NOTE - CHIEF COMPLAINT QUOTE
pt c/o fever, sore throat, body aches beginning last night. also c/o dizziness, nausea and shortness of breath. denies any chest pain. pt in no distress. hx. thyroid CA no chemo- thyroidectomy 4/17, asthma

## 2023-07-26 NOTE — ED ADULT NURSE NOTE - NSFALLUNIVINTERV_ED_ALL_ED
Bed/Stretcher in lowest position, wheels locked, appropriate side rails in place/Call bell, personal items and telephone in reach/Instruct patient to call for assistance before getting out of bed/chair/stretcher/Non-slip footwear applied when patient is off stretcher/Bas[place] to call system/Physically safe environment - no spills, clutter or unnecessary equipment/Purposeful proactive rounding/Room/bathroom lighting operational, light cord in reach

## 2023-07-26 NOTE — ED PROVIDER NOTE - ENMT, MLM
Airway patent, Nasal mucosa clear. Mouth with normal mucosa. Throat has no vesicles, no oropharyngeal exudates and uvula is midline. No tonsillar erythema or exudate

## 2023-07-26 NOTE — ED PROVIDER NOTE - CPE EDP RESP NORM
Called Patient and left message advising refill was sent to [facility] and to call clinic with any questions normal...

## 2023-07-26 NOTE — ED ADULT NURSE NOTE - OBJECTIVE STATEMENT
Patient is a 34 yo female, h/x thyroidectomy, presenting with fever, sore throat and body aches x 2 days. AAOx4, no signs of distress, reports daughter has coxsackie virus. Denies cough, vomiting, diarrhea, chest pain, SOB. 20G PIV placed to LAC, labs sent per orders. Medicated for pain and nausea. Fall precautions maintained.

## 2023-07-26 NOTE — ED PROVIDER NOTE - NSFOLLOWUPINSTRUCTIONS_ED_ALL_ED_FT
You were seen in the Emergency Department today for a fever.     You tested positive for a viral infection, rhinovirus.     You can take ibuprofen and tylenol at home for the fever and muscle aches. Do not return to work until you have been fever free for 24 hours. Please follow-up with your primary care provider or return to the ED for new or worsening symptoms.

## 2023-10-29 NOTE — OB PROVIDER TRIAGE NOTE - NSANTENATALSTERA_OBGYN_ALL_OB
Problem: Potential for Falls  Goal: Patient will remain free of falls  Description: INTERVENTIONS:  - Educate patient/family on patient safety including physical limitations  - Instruct patient to call for assistance with activity   - Consult OT/PT to assist with strengthening/mobility   - Keep Call bell within reach  - Keep bed low and locked with side rails adjusted as appropriate  - Keep care items and personal belongings within reach  - Initiate and maintain comfort rounds  - Make Fall Risk Sign visible to staff  - Offer Toileting every Hours, in advance of need  - Initiate/Maintain alarm  - Obtain necessary fall risk management equipment:   - Apply yellow socks and bracelet for high fall risk patients  - Consider moving patient to room near nurses station  Outcome: Progressing     Problem: MOBILITY - ADULT  Goal: Maintain or return to baseline ADL function  Description: INTERVENTIONS:  -  Assess patient's ability to carry out ADLs; assess patient's baseline for ADL function and identify physical deficits which impact ability to perform ADLs (bathing, care of mouth/teeth, toileting, grooming, dressing, etc.)  - Assess/evaluate cause of self-care deficits   - Assess range of motion  - Assess patient's mobility; develop plan if impaired  - Assess patient's need for assistive devices and provide as appropriate  - Encourage maximum independence but intervene and supervise when necessary  - Involve family in performance of ADLs  - Assess for home care needs following discharge   - Consider OT consult to assist with ADL evaluation and planning for discharge  - Provide patient education as appropriate  Outcome: Progressing  Goal: Maintains/Returns to pre admission functional level  Description: INTERVENTIONS:  - Perform BMAT or MOVE assessment daily.   - Set and communicate daily mobility goal to care team and patient/family/caregiver.    - Collaborate with rehabilitation services on mobility goals if consulted  - Perform Range of Motion  times a day. - Reposition patient every  hours.   - Dangle patient  times a day  - Stand patient  times a day  - Ambulate patient times a day  - Out of bed to chair  times a day   - Out of bed for meals  times a day  - Out of bed for toileting  - Record patient progress and toleration of activity level   Outcome: Progressing     Problem: PAIN - ADULT  Goal: Verbalizes/displays adequate comfort level or baseline comfort level  Description: Interventions:  - Encourage patient to monitor pain and request assistance  - Assess pain using appropriate pain scale  - Administer analgesics based on type and severity of pain and evaluate response  - Implement non-pharmacological measures as appropriate and evaluate response  - Consider cultural and social influences on pain and pain management  - Notify physician/advanced practitioner if interventions unsuccessful or patient reports new pain  Outcome: Progressing     Problem: INFECTION - ADULT  Goal: Absence or prevention of progression during hospitalization  Description: INTERVENTIONS:  - Assess and monitor for signs and symptoms of infection  - Monitor lab/diagnostic results  - Monitor all insertion sites, i.e. indwelling lines, tubes, and drains  - Monitor endotracheal if appropriate and nasal secretions for changes in amount and color  - [place] appropriate cooling/warming therapies per order  - Administer medications as ordered  - Instruct and encourage patient and family to use good hand hygiene technique  - Identify and instruct in appropriate isolation precautions for identified infection/condition  Outcome: Progressing     Problem: SAFETY ADULT  Goal: Patient will remain free of falls  Description: INTERVENTIONS:  - Educate patient/family on patient safety including physical limitations  - Instruct patient to call for assistance with activity   - Consult OT/PT to assist with strengthening/mobility   - Keep Call bell within reach  - Keep bed low and locked with side rails adjusted as appropriate  - Keep care items and personal belongings within reach  - Initiate and maintain comfort rounds  - Make Fall Risk Sign visible to staff  - Offer Toileting every  Hours, in advance of need  - Initiate/Maintain larm  - Obtain necessary fall risk management equipment: - Apply yellow socks and bracelet for high fall risk patients  - Consider moving patient to room near nurses station  Outcome: Progressing  Goal: Maintain or return to baseline ADL function  Description: INTERVENTIONS:  -  Assess patient's ability to carry out ADLs; assess patient's baseline for ADL function and identify physical deficits which impact ability to perform ADLs (bathing, care of mouth/teeth, toileting, grooming, dressing, etc.)  - Assess/evaluate cause of self-care deficits   - Assess range of motion  - Assess patient's mobility; develop plan if impaired  - Assess patient's need for assistive devices and provide as appropriate  - Encourage maximum independence but intervene and supervise when necessary  - Involve family in performance of ADLs  - Assess for home care needs following discharge   - Consider OT consult to assist with ADL evaluation and planning for discharge  - Provide patient education as appropriate  Outcome: Progressing  Goal: Maintains/Returns to pre admission functional level  Description: INTERVENTIONS:  - Perform BMAT or MOVE assessment daily.   - Set and communicate daily mobility goal to care team and patient/family/caregiver. - Collaborate with rehabilitation services on mobility goals if consulted  - Perform Range of Motionimes a day. - Reposition patient every  hours.   - Dangle patient  times a day  - Stand patient  times a day  - Ambulate patient  times a day  - Out of bed to chair  times a day   - Out of bed for meals  times a day  - Out of bed for toileting  - Record patient progress and toleration of activity level   Outcome: Progressing     Problem: DISCHARGE PLANNING  Goal: Discharge to home or other facility with appropriate resources  Description: INTERVENTIONS:  - Identify barriers to discharge w/patient and caregiver  - Arrange for needed discharge resources and transportation as appropriate  - Identify discharge learning needs (meds, wound care, etc.)  - Arrange for interpretive services to assist at discharge as needed  - Refer to Case Management Department for coordinating discharge planning if the patient needs post-hospital services based on physician/advanced practitioner order or complex needs related to functional status, cognitive ability, or social support system  Outcome: Progressing     Problem: Knowledge Deficit  Goal: Patient/family/caregiver demonstrates understanding of disease process, treatment plan, medications, and discharge instructions  Description: Complete learning assessment and assess knowledge base.   Interventions:  - Provide teaching at level of understanding  - Provide teaching via preferred learning methods  Outcome: Progressing     Problem: Prexisting or High Potential for Compromised Skin Integrity  Goal: Skin integrity is maintained or improved  Description: INTERVENTIONS:  - Identify patients at risk for skin breakdown  - Assess and monitor skin integrity  - Assess and monitor nutrition and hydration status  - Monitor labs   - Assess for incontinence   - Turn and reposition patient  - Assist with mobility/ambulation  - Relieve pressure over bony prominences  - Avoid friction and shearing  - Provide appropriate hygiene as needed including keeping skin clean and dry  - Evaluate need for skin moisturizer/barrier cream  - Collaborate with interdisciplinary team   - Patient/family teaching  - Consider wound care consult   Outcome: Progressing     Problem: Nutrition/Hydration-ADULT  Goal: Nutrient/Hydration intake appropriate for improving, restoring or maintaining nutritional needs  Description: Monitor and assess patient's nutrition/hydration status for malnutrition. Collaborate with interdisciplinary team and initiate plan and interventions as ordered. Monitor patient's weight and dietary intake as ordered or per policy. Utilize nutrition screening tool and intervene as necessary. Determine patient's food preferences and provide high-protein, high-caloric foods as appropriate.      INTERVENTIONS:  - Monitor oral intake, urinary output, labs, and treatment plans  - Assess nutrition and hydration status and recommend course of action  - Evaluate amount of meals eaten  - Assist patient with eating if necessary   - Allow adequate time for meals  - Recommend/ encourage appropriate diets, oral nutritional supplements, and vitamin/mineral supplements  - Order, calculate, and assess calorie counts as needed  - Recommend, monitor, and adjust tube feedings and TPN/PPN based on assessed needs  - Assess need for intravenous fluids  - Provide specific nutrition/hydration education as appropriate  - Include patient/family/caregiver in decisions related to nutrition  Outcome: Progressing No

## 2024-06-16 NOTE — ED ADULT NURSE NOTE - NS ED NOTE ABUSE SUSPICION NEGLECT YN
Continues with acetaminophen 500 mg q6h PRN  Continues with duloxetine 30 mg  once daily  Continues with tramadol 30 mg q8h PRN   No

## 2024-10-03 NOTE — ED ADULT TRIAGE NOTE - BP NONINVASIVE SYSTOLIC (MM HG)
114 Please let patient know I sent in the ozempic to the pharmacy that she may start on-- will start on 0.25 mg once a week for 4 weeks and then will increase to 0.50 mg once a week; encourage to work on healthy diet and exercise and  recommend she make a f/up appt with me in 4-6 weeks and bring in sugar readings with her; monitor for any side effects as we discussed and  rtc prior as needed

## 2025-04-24 NOTE — OB PROVIDER TRIAGE NOTE - NS_ATTENDINFORMED_OBGYN_ALL_OB
Pt ambulatory with c/o left side chest pain and upper left back pain for some time and a lump behind her left ear. Pt denies any heart/lung problems.     EKG performed prior to triage.   
DR MARINELLI